# Patient Record
Sex: FEMALE | Race: BLACK OR AFRICAN AMERICAN | NOT HISPANIC OR LATINO | Employment: FULL TIME | URBAN - METROPOLITAN AREA
[De-identification: names, ages, dates, MRNs, and addresses within clinical notes are randomized per-mention and may not be internally consistent; named-entity substitution may affect disease eponyms.]

---

## 2024-08-19 DIAGNOSIS — N18.6 ESRD (END STAGE RENAL DISEASE): Primary | ICD-10-CM

## 2024-09-06 ENCOUNTER — TELEPHONE (OUTPATIENT)
Dept: VASCULAR SURGERY | Facility: CLINIC | Age: 49
End: 2024-09-06
Payer: MEDICAID

## 2024-09-06 NOTE — TELEPHONE ENCOUNTER
Attempted to contact the pt in reference to rescheduling an appt but no answer.Left a voice message with a call back number [phone].Appt rescheduled and appt letter placed in the mail.

## 2024-09-16 ENCOUNTER — TELEPHONE (OUTPATIENT)
Dept: VASCULAR SURGERY | Facility: CLINIC | Age: 49
End: 2024-09-16
Payer: MEDICAID

## 2024-09-16 NOTE — TELEPHONE ENCOUNTER
"Attempted to contact the pt in reference to appt scheduled for 9/18/24 but male answered and verbalized "wrong  number". Attempted to contact the pt at work and a female answered and verbalized "she's gone for the day she'll be back tomorrow". A message for the pt to contact the clinic at [phone].  "

## 2024-09-17 ENCOUNTER — TELEPHONE (OUTPATIENT)
Dept: VASCULAR SURGERY | Facility: CLINIC | Age: 49
End: 2024-09-17
Payer: MEDICAID

## 2024-09-17 NOTE — TELEPHONE ENCOUNTER
"Attempted to contact the pt again at work but the female answered and verbalized her name was "May".When I confirmed the identifiers it wasn't the pt. All other numbers on file were disconnected or wrong numbers. Spoke with Shelly at Dr Brizuela's office to obtain updated phone numbers.Spoke with the pt and informed her of appt scheduled for tomorrow.Pt verbalized she's not able to make it.Appt rescheduled and confirmed with the pt.   "

## 2024-10-30 ENCOUNTER — HOSPITAL ENCOUNTER (OUTPATIENT)
Dept: VASCULAR SURGERY | Facility: CLINIC | Age: 49
Discharge: HOME | End: 2024-10-30
Attending: SURGERY
Payer: MEDICAID

## 2024-10-30 ENCOUNTER — INITIAL CONSULT (OUTPATIENT)
Dept: VASCULAR SURGERY | Facility: CLINIC | Age: 49
End: 2024-10-30
Payer: MEDICAID

## 2024-10-30 ENCOUNTER — LAB VISIT (OUTPATIENT)
Dept: LAB | Facility: HOSPITAL | Age: 49
End: 2024-10-30
Attending: SURGERY
Payer: MEDICAID

## 2024-10-30 VITALS
TEMPERATURE: 98 F | WEIGHT: 152.13 LBS | BODY MASS INDEX: 25.34 KG/M2 | SYSTOLIC BLOOD PRESSURE: 156 MMHG | DIASTOLIC BLOOD PRESSURE: 76 MMHG | HEIGHT: 65 IN | HEART RATE: 87 BPM

## 2024-10-30 DIAGNOSIS — Z01.818 PRE-OP TESTING: ICD-10-CM

## 2024-10-30 DIAGNOSIS — N18.6 ESRD (END STAGE RENAL DISEASE): Primary | ICD-10-CM

## 2024-10-30 DIAGNOSIS — N18.6 ESRD (END STAGE RENAL DISEASE): ICD-10-CM

## 2024-10-30 LAB
ANION GAP SERPL CALC-SCNC: 13 MMOL/L (ref 8–16)
BASOPHILS # BLD AUTO: 0.04 K/UL (ref 0–0.2)
BASOPHILS NFR BLD: 0.5 % (ref 0–1.9)
BUN SERPL-MCNC: 18 MG/DL (ref 6–20)
CALCIUM SERPL-MCNC: 10.8 MG/DL (ref 8.7–10.5)
CHLORIDE SERPL-SCNC: 105 MMOL/L (ref 95–110)
CO2 SERPL-SCNC: 25 MMOL/L (ref 23–29)
CREAT SERPL-MCNC: 5.3 MG/DL (ref 0.5–1.4)
DIFFERENTIAL METHOD BLD: ABNORMAL
EOSINOPHIL # BLD AUTO: 0.3 K/UL (ref 0–0.5)
EOSINOPHIL NFR BLD: 4.1 % (ref 0–8)
ERYTHROCYTE [DISTWIDTH] IN BLOOD BY AUTOMATED COUNT: 14.6 % (ref 11.5–14.5)
EST. GFR  (NO RACE VARIABLE): 9.4 ML/MIN/1.73 M^2
GLUCOSE SERPL-MCNC: 158 MG/DL (ref 70–110)
HCT VFR BLD AUTO: 41.3 % (ref 37–48.5)
HGB BLD-MCNC: 12.7 G/DL (ref 12–16)
IMM GRANULOCYTES # BLD AUTO: 0.01 K/UL (ref 0–0.04)
IMM GRANULOCYTES NFR BLD AUTO: 0.1 % (ref 0–0.5)
LYMPHOCYTES # BLD AUTO: 2.3 K/UL (ref 1–4.8)
LYMPHOCYTES NFR BLD: 28.8 % (ref 18–48)
MCH RBC QN AUTO: 30.5 PG (ref 27–31)
MCHC RBC AUTO-ENTMCNC: 30.8 G/DL (ref 32–36)
MCV RBC AUTO: 99 FL (ref 82–98)
MONOCYTES # BLD AUTO: 0.5 K/UL (ref 0.3–1)
MONOCYTES NFR BLD: 5.8 % (ref 4–15)
NEUTROPHILS # BLD AUTO: 4.9 K/UL (ref 1.8–7.7)
NEUTROPHILS NFR BLD: 60.7 % (ref 38–73)
NRBC BLD-RTO: 0 /100 WBC
PLATELET # BLD AUTO: 262 K/UL (ref 150–450)
PMV BLD AUTO: 9.8 FL (ref 9.2–12.9)
POTASSIUM SERPL-SCNC: 4.5 MMOL/L (ref 3.5–5.1)
RBC # BLD AUTO: 4.16 M/UL (ref 4–5.4)
SODIUM SERPL-SCNC: 143 MMOL/L (ref 136–145)
WBC # BLD AUTO: 8.1 K/UL (ref 3.9–12.7)

## 2024-10-30 PROCEDURE — 99204 OFFICE O/P NEW MOD 45 MIN: CPT | Mod: S$PBB,,, | Performed by: SURGERY

## 2024-10-30 PROCEDURE — 85025 COMPLETE CBC W/AUTO DIFF WBC: CPT | Performed by: SURGERY

## 2024-10-30 PROCEDURE — 99213 OFFICE O/P EST LOW 20 MIN: CPT | Mod: PBBFAC | Performed by: SURGERY

## 2024-10-30 PROCEDURE — 36415 COLL VENOUS BLD VENIPUNCTURE: CPT | Performed by: SURGERY

## 2024-10-30 PROCEDURE — 99999 PR PBB SHADOW E&M-EST. PATIENT-LVL III: CPT | Mod: PBBFAC,,, | Performed by: SURGERY

## 2024-10-30 PROCEDURE — 80048 BASIC METABOLIC PNL TOTAL CA: CPT | Performed by: SURGERY

## 2024-10-30 RX ORDER — CALCITRIOL 0.5 UG/1
CAPSULE ORAL
COMMUNITY
Start: 2024-10-29

## 2024-10-30 RX ORDER — ATORVASTATIN CALCIUM 40 MG/1
40 TABLET, FILM COATED ORAL
COMMUNITY

## 2024-10-30 RX ORDER — HYDRALAZINE HYDROCHLORIDE 25 MG/1
25 TABLET, FILM COATED ORAL 3 TIMES DAILY
COMMUNITY

## 2024-10-30 RX ORDER — CLONIDINE HYDROCHLORIDE 0.3 MG/1
0.3 TABLET ORAL NIGHTLY
COMMUNITY

## 2024-10-30 RX ORDER — ASPIRIN 81 MG/1
81 TABLET ORAL
COMMUNITY

## 2024-10-30 RX ORDER — INSULIN LISPRO 100 [IU]/ML
INJECTION, SUSPENSION SUBCUTANEOUS
COMMUNITY

## 2024-10-30 RX ORDER — SEVELAMER CARBONATE 800 MG/1
800 TABLET, FILM COATED ORAL 3 TIMES DAILY
COMMUNITY

## 2024-10-30 RX ORDER — INSULIN GLARGINE 100 [IU]/ML
10 INJECTION, SOLUTION SUBCUTANEOUS NIGHTLY
COMMUNITY
Start: 2024-10-02

## 2024-10-30 RX ORDER — FUROSEMIDE 20 MG/1
20 TABLET ORAL
COMMUNITY
Start: 2024-10-02

## 2024-10-30 RX ORDER — CLOPIDOGREL BISULFATE 75 MG/1
75 TABLET ORAL
COMMUNITY

## 2024-10-30 RX ORDER — FLUOXETINE 10 MG/1
10 CAPSULE ORAL
COMMUNITY

## 2024-10-30 RX ORDER — CETIRIZINE HYDROCHLORIDE 10 MG/1
10 TABLET ORAL
COMMUNITY

## 2024-10-30 RX ORDER — CARISOPRODOL 350 MG/1
350 TABLET ORAL
COMMUNITY

## 2024-10-30 RX ORDER — AMLODIPINE BESYLATE 10 MG/1
10 TABLET ORAL
COMMUNITY

## 2024-10-30 RX ORDER — LOSARTAN POTASSIUM 100 MG/1
100 TABLET ORAL
COMMUNITY

## 2024-10-30 RX ORDER — ALPRAZOLAM 2 MG/1
2 TABLET ORAL 2 TIMES DAILY
COMMUNITY
Start: 2024-10-02

## 2024-10-30 RX ORDER — CYPROHEPTADINE HYDROCHLORIDE 2 MG/5ML
SOLUTION ORAL
COMMUNITY
Start: 2024-10-26

## 2024-10-30 RX ORDER — SODIUM BICARBONATE 650 MG/1
650 TABLET ORAL 3 TIMES DAILY
COMMUNITY
Start: 2024-05-14

## 2024-10-30 RX ORDER — BUPRENORPHINE AND NALOXONE 8; 2 MG/1; MG/1
FILM, SOLUBLE BUCCAL; SUBLINGUAL 3 TIMES DAILY
COMMUNITY

## 2024-11-01 ENCOUNTER — DOCUMENTATION ONLY (OUTPATIENT)
Dept: VASCULAR SURGERY | Facility: CLINIC | Age: 49
End: 2024-11-01
Payer: MEDICAID

## 2024-11-06 ENCOUNTER — HOSPITAL ENCOUNTER (OUTPATIENT)
Dept: RADIOLOGY | Facility: HOSPITAL | Age: 49
Discharge: HOME | End: 2024-11-06
Attending: SURGERY
Payer: MEDICAID

## 2024-11-06 ENCOUNTER — HOSPITAL ENCOUNTER (OUTPATIENT)
Dept: CARDIOLOGY | Facility: CLINIC | Age: 49
Discharge: HOME | End: 2024-11-06
Payer: MEDICAID

## 2024-11-06 DIAGNOSIS — Z01.818 PRE-OP TESTING: ICD-10-CM

## 2024-11-06 LAB
OHS QRS DURATION: 124 MS
OHS QTC CALCULATION: 472 MS

## 2024-11-06 PROCEDURE — 93005 ELECTROCARDIOGRAM TRACING: CPT | Mod: PBBFAC | Performed by: INTERNAL MEDICINE

## 2024-11-06 PROCEDURE — 71046 X-RAY EXAM CHEST 2 VIEWS: CPT | Mod: 26,,, | Performed by: RADIOLOGY

## 2024-11-06 PROCEDURE — 93010 ELECTROCARDIOGRAM REPORT: CPT | Mod: S$PBB,,, | Performed by: INTERNAL MEDICINE

## 2024-11-06 PROCEDURE — 71046 X-RAY EXAM CHEST 2 VIEWS: CPT | Mod: TC

## 2024-11-13 ENCOUNTER — TELEPHONE (OUTPATIENT)
Dept: VASCULAR SURGERY | Facility: CLINIC | Age: 49
End: 2024-11-13
Payer: MEDICAID

## 2024-11-13 NOTE — TELEPHONE ENCOUNTER
Spoke with the pt and informed her of the time of arrival for surgery tomorrow is 530am at the main campus on [address],second floor,[facility], Pt also reminded not to eat anything after 1130pm but may have water only based on fluid restriction.Pt verbalized understanding of information received.

## 2024-11-18 ENCOUNTER — TELEPHONE (OUTPATIENT)
Dept: VASCULAR SURGERY | Facility: CLINIC | Age: 49
End: 2024-11-18
Payer: MEDICAID

## 2024-11-18 DIAGNOSIS — N18.6 ESRD (END STAGE RENAL DISEASE): Primary | ICD-10-CM

## 2024-11-18 NOTE — TELEPHONE ENCOUNTER
Spoke with the pt and informed her of the new surgery date of 12/17/24.Pt also informed not to eat anything after 11pm on 12/16/24 and she verbalizes understanding of information received.

## 2024-12-16 ENCOUNTER — ANESTHESIA EVENT (OUTPATIENT)
Dept: SURGERY | Facility: HOSPITAL | Age: 49
End: 2024-12-16
Payer: MEDICAID

## 2024-12-16 ENCOUNTER — TELEPHONE (OUTPATIENT)
Dept: VASCULAR SURGERY | Facility: CLINIC | Age: 49
End: 2024-12-16
Payer: MEDICAID

## 2024-12-16 NOTE — TELEPHONE ENCOUNTER
Spoke with the pt and informed her of the time of arrival for surgery tomorrow is 5am at the main campus on [facility],second floor,[facility].Pt also informed not to eat anything after 11pm and may have water only until 5am but to remain on fluid restrictions.pt verbalized understanding of information received.

## 2024-12-17 ENCOUNTER — HOSPITAL ENCOUNTER (OUTPATIENT)
Facility: HOSPITAL | Age: 49
Discharge: HOME | End: 2024-12-17
Attending: SURGERY | Admitting: SURGERY
Payer: MEDICAID

## 2024-12-17 ENCOUNTER — ANESTHESIA (OUTPATIENT)
Dept: SURGERY | Facility: HOSPITAL | Age: 49
End: 2024-12-17
Payer: MEDICAID

## 2024-12-17 VITALS
BODY MASS INDEX: 23.3 KG/M2 | HEART RATE: 67 BPM | RESPIRATION RATE: 12 BRPM | HEIGHT: 66 IN | SYSTOLIC BLOOD PRESSURE: 149 MMHG | OXYGEN SATURATION: 99 % | WEIGHT: 145 LBS | DIASTOLIC BLOOD PRESSURE: 82 MMHG | TEMPERATURE: 99 F

## 2024-12-17 DIAGNOSIS — N18.6 ESRD (END STAGE RENAL DISEASE): Primary | ICD-10-CM

## 2024-12-17 DIAGNOSIS — I77.0 ARTERIOVENOUS FISTULA: ICD-10-CM

## 2024-12-17 LAB
POCT GLUCOSE: 218 MG/DL (ref 70–110)
POCT GLUCOSE: 230 MG/DL (ref 70–110)

## 2024-12-17 PROCEDURE — 82962 GLUCOSE BLOOD TEST: CPT | Performed by: SURGERY

## 2024-12-17 PROCEDURE — 25000003 PHARM REV CODE 250: Performed by: STUDENT IN AN ORGANIZED HEALTH CARE EDUCATION/TRAINING PROGRAM

## 2024-12-17 PROCEDURE — 36000706: Performed by: SURGERY

## 2024-12-17 PROCEDURE — 71000016 HC POSTOP RECOV ADDL HR: Performed by: SURGERY

## 2024-12-17 PROCEDURE — 37000009 HC ANESTHESIA EA ADD 15 MINS: Performed by: SURGERY

## 2024-12-17 PROCEDURE — 63600175 PHARM REV CODE 636 W HCPCS: Performed by: REGISTERED NURSE

## 2024-12-17 PROCEDURE — 63600175 PHARM REV CODE 636 W HCPCS: Performed by: SURGERY

## 2024-12-17 PROCEDURE — 71000015 HC POSTOP RECOV 1ST HR: Performed by: SURGERY

## 2024-12-17 PROCEDURE — 25000003 PHARM REV CODE 250: Performed by: REGISTERED NURSE

## 2024-12-17 PROCEDURE — 36000707: Performed by: SURGERY

## 2024-12-17 PROCEDURE — 37000008 HC ANESTHESIA 1ST 15 MINUTES: Performed by: SURGERY

## 2024-12-17 PROCEDURE — 63600175 PHARM REV CODE 636 W HCPCS: Performed by: STUDENT IN AN ORGANIZED HEALTH CARE EDUCATION/TRAINING PROGRAM

## 2024-12-17 PROCEDURE — 71000044 HC DOSC ROUTINE RECOVERY FIRST HOUR: Performed by: SURGERY

## 2024-12-17 RX ORDER — OXYCODONE HYDROCHLORIDE 5 MG/1
5 TABLET ORAL EVERY 4 HOURS PRN
Qty: 8 TABLET | Refills: 0 | Status: SHIPPED | OUTPATIENT
Start: 2024-12-17

## 2024-12-17 RX ORDER — PROPOFOL 10 MG/ML
VIAL (ML) INTRAVENOUS
Status: DISCONTINUED | OUTPATIENT
Start: 2024-12-17 | End: 2024-12-17

## 2024-12-17 RX ORDER — HYDROMORPHONE HYDROCHLORIDE 1 MG/ML
0.2 INJECTION, SOLUTION INTRAMUSCULAR; INTRAVENOUS; SUBCUTANEOUS EVERY 5 MIN PRN
Status: DISCONTINUED | OUTPATIENT
Start: 2024-12-17 | End: 2024-12-17 | Stop reason: HOSPADM

## 2024-12-17 RX ORDER — HEPARIN SODIUM 1000 [USP'U]/ML
INJECTION, SOLUTION INTRAVENOUS; SUBCUTANEOUS
Status: DISCONTINUED | OUTPATIENT
Start: 2024-12-17 | End: 2024-12-17

## 2024-12-17 RX ORDER — HALOPERIDOL 5 MG/ML
0.5 INJECTION INTRAMUSCULAR EVERY 10 MIN PRN
Status: DISCONTINUED | OUTPATIENT
Start: 2024-12-17 | End: 2024-12-17 | Stop reason: HOSPADM

## 2024-12-17 RX ORDER — FENTANYL CITRATE 50 UG/ML
25 INJECTION, SOLUTION INTRAMUSCULAR; INTRAVENOUS EVERY 5 MIN PRN
Status: DISCONTINUED | OUTPATIENT
Start: 2024-12-17 | End: 2024-12-17 | Stop reason: HOSPADM

## 2024-12-17 RX ORDER — HEPARIN SOD,PORCINE/0.9 % NACL 1000/500ML
INTRAVENOUS SOLUTION INTRAVENOUS
Status: DISCONTINUED | OUTPATIENT
Start: 2024-12-17 | End: 2024-12-17 | Stop reason: HOSPADM

## 2024-12-17 RX ORDER — CEFAZOLIN 2 G/1
2 INJECTION, POWDER, FOR SOLUTION INTRAMUSCULAR; INTRAVENOUS
Status: DISCONTINUED | OUTPATIENT
Start: 2024-12-17 | End: 2024-12-17 | Stop reason: HOSPADM

## 2024-12-17 RX ORDER — MIDAZOLAM HYDROCHLORIDE 1 MG/ML
.5-4 INJECTION, SOLUTION INTRAMUSCULAR; INTRAVENOUS
Status: DISCONTINUED | OUTPATIENT
Start: 2024-12-17 | End: 2024-12-17 | Stop reason: HOSPADM

## 2024-12-17 RX ORDER — GLUCAGON 1 MG
1 KIT INJECTION
Status: DISCONTINUED | OUTPATIENT
Start: 2024-12-17 | End: 2024-12-17 | Stop reason: HOSPADM

## 2024-12-17 RX ORDER — PROPOFOL 10 MG/ML
INJECTION, EMULSION INTRAVENOUS CONTINUOUS PRN
Status: DISCONTINUED | OUTPATIENT
Start: 2024-12-17 | End: 2024-12-17

## 2024-12-17 RX ORDER — SODIUM CHLORIDE 9 MG/ML
INJECTION, SOLUTION INTRAVENOUS CONTINUOUS
Status: DISCONTINUED | OUTPATIENT
Start: 2024-12-17 | End: 2024-12-17 | Stop reason: HOSPADM

## 2024-12-17 RX ORDER — LIDOCAINE HYDROCHLORIDE 20 MG/ML
INJECTION INTRAVENOUS
Status: DISCONTINUED | OUTPATIENT
Start: 2024-12-17 | End: 2024-12-17

## 2024-12-17 RX ORDER — ONDANSETRON HYDROCHLORIDE 2 MG/ML
INJECTION, SOLUTION INTRAVENOUS
Status: DISCONTINUED | OUTPATIENT
Start: 2024-12-17 | End: 2024-12-17

## 2024-12-17 RX ORDER — DEXMEDETOMIDINE HYDROCHLORIDE 100 UG/ML
INJECTION, SOLUTION INTRAVENOUS
Status: DISCONTINUED | OUTPATIENT
Start: 2024-12-17 | End: 2024-12-17

## 2024-12-17 RX ORDER — PAPAVERINE HYDROCHLORIDE 30 MG/ML
INJECTION INTRAMUSCULAR; INTRAVENOUS
Status: DISCONTINUED | OUTPATIENT
Start: 2024-12-17 | End: 2024-12-17 | Stop reason: HOSPADM

## 2024-12-17 RX ORDER — OXYCODONE HYDROCHLORIDE 5 MG/1
5 TABLET ORAL ONCE
Status: COMPLETED | OUTPATIENT
Start: 2024-12-17 | End: 2024-12-17

## 2024-12-17 RX ORDER — SODIUM CHLORIDE 0.9 % (FLUSH) 0.9 %
10 SYRINGE (ML) INJECTION
Status: DISCONTINUED | OUTPATIENT
Start: 2024-12-17 | End: 2024-12-17 | Stop reason: HOSPADM

## 2024-12-17 RX ORDER — PROTAMINE SULFATE 10 MG/ML
INJECTION, SOLUTION INTRAVENOUS
Status: DISCONTINUED | OUTPATIENT
Start: 2024-12-17 | End: 2024-12-17

## 2024-12-17 RX ORDER — FENTANYL CITRATE 50 UG/ML
25-200 INJECTION, SOLUTION INTRAMUSCULAR; INTRAVENOUS
Status: DISCONTINUED | OUTPATIENT
Start: 2024-12-17 | End: 2024-12-17 | Stop reason: HOSPADM

## 2024-12-17 RX ADMIN — HEPARIN SODIUM 5000 UNITS: 1000 INJECTION, SOLUTION INTRAVENOUS; SUBCUTANEOUS at 08:12

## 2024-12-17 RX ADMIN — DEXMEDETOMIDINE 8 MCG: 100 INJECTION, SOLUTION, CONCENTRATE INTRAVENOUS at 07:12

## 2024-12-17 RX ADMIN — SODIUM CHLORIDE: 9 INJECTION, SOLUTION INTRAVENOUS at 07:12

## 2024-12-17 RX ADMIN — DEXMEDETOMIDINE 8 MCG: 100 INJECTION, SOLUTION, CONCENTRATE INTRAVENOUS at 08:12

## 2024-12-17 RX ADMIN — LIDOCAINE HYDROCHLORIDE 50 MG: 20 INJECTION INTRAVENOUS at 07:12

## 2024-12-17 RX ADMIN — MEPIVACAINE HYDROCHLORIDE 25 ML: 15 INJECTION, SOLUTION EPIDURAL; INFILTRATION at 07:12

## 2024-12-17 RX ADMIN — OXYCODONE 5 MG: 5 TABLET ORAL at 11:12

## 2024-12-17 RX ADMIN — PROTAMINE SULFATE 20 MG: 10 INJECTION, SOLUTION INTRAVENOUS at 08:12

## 2024-12-17 RX ADMIN — PROTAMINE SULFATE 5 MG: 10 INJECTION, SOLUTION INTRAVENOUS at 08:12

## 2024-12-17 RX ADMIN — ONDANSETRON 4 MG: 2 INJECTION INTRAMUSCULAR; INTRAVENOUS at 07:12

## 2024-12-17 RX ADMIN — Medication 2 G: at 07:12

## 2024-12-17 RX ADMIN — PROPOFOL 50 MCG/KG/MIN: 10 INJECTION, EMULSION INTRAVENOUS at 07:12

## 2024-12-17 RX ADMIN — PROPOFOL 50 MG: 10 INJECTION, EMULSION INTRAVENOUS at 07:12

## 2024-12-17 NOTE — ANESTHESIA PREPROCEDURE EVALUATION
12/17/2024  May Feldman is a 49 y.o., female.      Pre-op Assessment    I have reviewed the Patient Summary Reports.     I have reviewed the Nursing Notes. I have reviewed the NPO Status.   I have reviewed the Medications.     Review of Systems  Anesthesia Hx:  No problems with previous Anesthesia             Denies Family Hx of Anesthesia complications.    Denies Personal Hx of Anesthesia complications.                    Social:  Smoker       Hematology/Oncology:  Hematology Normal   Oncology Normal                                   EENT/Dental:  EENT/Dental Normal           Cardiovascular:     Hypertension   CAD   CABG/stent                                       Pulmonary:  Pulmonary Normal                       Renal/:  Chronic Renal Disease, ESRD, Dialysis                Musculoskeletal:  Musculoskeletal Normal                Neurological:  TIA,                                     Endocrine:  Diabetes, type 2               Physical Exam  General: Oriented, Alert and Cooperative    Airway:  Mallampati: III   Mouth Opening: Normal  TM Distance: Normal    Dental:  Periodontal disease    Chest/Lungs:  Clear to auscultation, Normal Respiratory Rate    Heart:  Rate: Normal  Rhythm: Regular Rhythm        Anesthesia Plan  Type of Anesthesia, risks & benefits discussed:    Anesthesia Type: Regional, Gen Natural Airway  Intra-op Monitoring Plan: Standard ASA Monitors  Post Op Pain Control Plan: multimodal analgesia, peripheral nerve block and IV/PO Opioids PRN  Induction:  IV  Airway Plan: , Post-Induction  Informed Consent: Informed consent signed with the Patient and all parties understand the risks and agree with anesthesia plan.  All questions answered.   ASA Score: 3  Day of Surgery Review of History & Physical: H&P Update referred to the surgeon/provider.    Ready For Surgery From Anesthesia Perspective.      .

## 2024-12-17 NOTE — PLAN OF CARE
----- Message from Kady Worley CNP sent at 9/29/2022  6:21 PM CDT -----  Regarding: followup on patient       Discharge instructions reviewed with daughter.  Pt states ready to get dressed

## 2024-12-17 NOTE — ANESTHESIA POSTPROCEDURE EVALUATION
Anesthesia Post Evaluation    Patient: May Feldman    Procedure(s) Performed: Procedure(s) (LRB):  INSERTION, GRAFT, ARTERIOVENOUS, UPPER EXTREMITY (Left)    Final Anesthesia Type: general      Patient location during evaluation: PACU  Patient participation: Yes- Able to Participate  Level of consciousness: awake and alert  Post-procedure vital signs: reviewed and stable  Pain management: adequate  Airway patency: patent  DERIC mitigation strategies: Multimodal analgesia, Preoperative use of mandibular advancement devices or oral appliances and Intraoperative administration of CPAP, nasopharyngeal airway, or oral appliance during sedation  PONV status at discharge: No PONV  Anesthetic complications: no      Cardiovascular status: blood pressure returned to baseline, hemodynamically stable and stable  Respiratory status: unassisted and spontaneous ventilation  Hydration status: euvolemic  Follow-up not needed.              Vitals Value Taken Time   /82 12/17/24 1146   Temp 37 °C (98.6 °F) 12/17/24 1145   Pulse 72 12/17/24 1154   Resp 11 12/17/24 1153   SpO2 100 % 12/17/24 1154   Vitals shown include unfiled device data.      No case tracking events are documented in the log.      Pain/Kaylen Score: Pain Rating Prior to Med Admin: 5 (12/17/2024 11:06 AM)  Kaylen Score: 9 (12/17/2024 11:45 AM)

## 2024-12-17 NOTE — DISCHARGE INSTRUCTIONS
VASCULAR SURGERY DISCHARGE INSTRUCTIONS    Woundcare:  - Take your incision dressing off 2 days after your surgery and gently rinse your incision with soap and water daily. Pad the incision dry afterward  - If you have a dialysis catheter in place, keep your catheter dressing clean and dry  - If you do not have a catheter, take a full shower daily beginning 2 days after the surgery. Allow soap and water to run over your incision. Pat the incision dry afterward  - When resting or sleeping, try to keep your arm elevated to shoulder level on pillows to reduce swelling  - If you notice clear drainage from your incision, you can apply dry gauze daily and secure in place with tape or gentle elastic wrap    Activity:  - Avoid prolonged exertion of the affected arm  - Avoid keeping your arm down below your chest for prolonged periods of time (this could lead to increased swelling)  - No heavy lifting with the affected arm  - Sleep with your arm elevated on pillows at night to reduce swelling  -- No swimming in pools, bookjam, [employer] etc. for 6 weeks after your surgery    Diet:  -Resume your pre-operative home diet    Follow up:  -Refer to follow up instructions     Call Vascular Surgery Office at [phone] if you experience:  -Increased redness, warmth, tenderness, or draining pus at your incision(s)  -Worsening fevers, chills, nausea/vomiting  -Pain, weakness, coldness, or numbness in your hand  -Uncontrolled pain  -Your call will be returned within 24 hours and further instructions will be provided    Go to ER/Urgent Care if you experience:  -Worsening shortness of breath or chest pain     DISCHARGE INSTRUCTIONS FOR AV FISTULA OR DECLOT:    ACTIVITY LEVEL:  If you received sedation or an anesthetic, you may feel sleepy for several hours. Rest until you are more awake.  Gradually resume light activity. Avoid heavy lifting and tight garments. Remember do not let your affected  arm be used for blood pressure measurements  or for blood drawing. You should check with your doctor about  when you may return to work. No heavy lifting.  DIET:  At home, continue with liquids, and if there is no nausea, you may eat a soft diet. Gradually resume your  regular diet.  BATHING:  _____ Sponge bathe only.  _____ Remove your dressing in ____ days.  _____ You may shower in ____ days.  MEDICATIONS:  You will receive instructions for any pain and/or antibiotic prescriptions. Pain medication should be taken only  if needed and as directed. Antibiotics should be taken as directed until the entire prescription is completed.  OTHER INSTRUCTIONS:_________________________________________________________________  GENERAL INSTRUCTIONS:  You should learn to recognize the flow (thrill) in the graft. Ask the nurse to show you how to check this.  WHEN TO CALL THE DOCTOR:   If there are marked changes in the thrill or no thrill at all.   Any obvious bleeding.   Redness or swelling around the incision.   Fever over 101°F (38.4°C).   Drainage (pus) from the wound.   Persistent pain not relieved by the pain medication.   Numbness or tingling in your hand that does not go away.  RETURN APPOINTMENT:  _________________________________________________________________________________________  FOR EMERGENCIES:  If any unusual problems or difficulties occur, contact  _____________________ or the resident at  [phone] (page ) or at the Clinic office, _____________________.

## 2024-12-17 NOTE — H&P
May Gaston  12/17/2024    HPI:  Patient is a 49 y.o. female with a h/o stage V CKD on HD since early 2024, active tobacco use, HLD, HTN, CAD/MI s/p PCIs x2, TIA, and DM II who is here today in the preoperative suite for insertion of left AV Graft. Right hand dominant.     +MI April 2024  Tobacco use: yes, 1/2 pack per day; 35 year smoking hx    Pernell Lucas MD   Employment: Disabled    No current facility-administered medications for this encounter.    PHYSICAL EXAM:                  Extremities:   2+ L brachial and radial pulses     Negative L Ulises's test    LAB RESULTS:  Lab Results   Component Value Date    K 4.5 10/30/2024    K 5.4 (H) 08/13/2023    K 5.9 (H) 03/13/2023    CREATININE 5.3 (H) 10/30/2024    CREATININE 6.68 (H) 03/13/2023    CREATININE 5.32 (H) 01/25/2023     Lab Results   Component Value Date    WBC 8.10 10/30/2024    WBC 0-5 08/14/2023    WBC 7.8 08/03/2023    HCT 41.3 10/30/2024    HCT 26.3 (L) 08/03/2023    HCT 28.9 (L) 04/04/2014     10/30/2024     (H) 04/04/2014     Lab Results   Component Value Date    HGBA1C 7.8 (H) 08/02/2023    HGBA1C 7.4 (H) 03/13/2023    HGBA1C 7.3 (H) 11/07/2022       IMAGING (I have independently reviewed and interpreted the following tests):  Vein mapping: small cephalic veins x2  Basilic vein, R 2.9mm, L 1.5mm  Brachial veins R 3.8mm, L 3.7mm  Patent brachial arteries x2    IMP/PLAN:     49 y.o. female with a h/o stage V CKD on HD since early 2024, active tobacco use, HLD, HTN, CAD/MI s/p PCIs x2, TIA, and DM II in need of AV acces - seems she has no family support and gets rides from HD unit  R hand dominant.   Plan to proceed for LUE AV Graft later today (12/17)       Ehsan Melchor MD DFSVS FACS   Vascular/Endovascular Surgery    Time spent personally reviewing the patient's chart, interpreting images and test, and conferring with physicians was HBtimespent2: 45 mins.

## 2024-12-17 NOTE — BRIEF OP NOTE
Brief Operative Note  Date: 12/17/2024    Pre-op Diagnosis:  ESRD (end stage renal disease) [N18.6]    Post-op Diagnosis:  Same    Procedure(s): Creation L 1st stage brachial artery to basilic vein AVF     Surgeon: RUBIN FOUNTAIN FACS    Assistant: Renuka Cook MD - Resident - Assisting    Anesthesia: Regional    Findings/Key Components:  Strong thrill in L 1st stage brachial artery to basilic vein AVF; dilated to 3-4mm  2+ L radial pulses - no augmentation with with AVF compression  Will need transposition in 3+ months    EBL: < 10 ml    Implants: * No implants in log *    Specimens (From admission, onward)      None          I attest to being present for the procedure and performing the case.  MD ESSIE Whitt FACS  Discharge Note  SUMMARY    Admit Date: 12/17/2024    Attending Physician: Ehsan Melchor MD FACS    Discharge Physician: Ehsan Melchor MD FACS    Discharge Date: 12/17/2024    Final Diagnosis: ESRD (end stage renal disease) [N18.6]    Outcome of Treatment: Successful AVF creation    Disposition: Home or self-care    Patient Instructions:   Current Discharge Medication List        CONTINUE these medications which have NOT CHANGED    Details   ALPRAZolam (XANAX) 2 MG Tab Take 2 mg by mouth 2 (two) times daily.      amLODIPine (NORVASC) 10 MG tablet Take 10 mg by mouth.      aspirin (ECOTRIN) 81 MG EC tablet Take 81 mg by mouth.      atorvastatin (LIPITOR) 40 MG tablet Take 40 mg by mouth.      buprenorphine-naloxone 8-2 mg (SUBOXONE) 8-2 mg Place under the tongue 3 (three) times daily.      carisoprodoL (SOMA) 350 MG tablet Take 350 mg by mouth.      cetirizine (ZYRTEC) 10 MG tablet Take 10 mg by mouth.      cloNIDine (CATAPRES) 0.3 MG tablet Take 0.3 mg by mouth every evening.      clopidogreL (PLAVIX) 75 mg tablet Take 75 mg by mouth.      cyproheptadine (,PERIACTIN,) 2 mg/5 mL syrup Take by mouth.      FLUoxetine 10 MG capsule Take 10 mg by mouth.      furosemide (LASIX) 20 MG tablet Take  20 mg by mouth.      HUMALOG MIX 75-25 KWIKPEN 100 unit/mL (75-25) InPn SMARTSI Unit(s) SUB-Q      hydrALAZINE (APRESOLINE) 25 MG tablet Take 25 mg by mouth 3 (three) times daily.      lisinopril 10 MG tablet Take 10 mg by mouth once daily.      losartan (COZAAR) 100 MG tablet Take 100 mg by mouth.      sodium bicarbonate 650 MG tablet Take 650 mg by mouth 3 (three) times daily.      calcitRIOL (ROCALTROL) 0.5 MCG Cap Take by mouth.      insulin lispro (HUMALOG) 100 unit/mL injection Inject into the skin 3 (three) times daily before meals.      LANTUS SOLOSTAR U-100 INSULIN 100 unit/mL (3 mL) InPn pen Inject 10 Units into the skin every evening.      sevelamer carbonate (RENVELA) 800 mg Tab Take 800 mg by mouth 3 (three) times daily.             Diet:  Resume pre-operative diet    Activity:  Ad tona    Follow-up:  Follow-up in clinic with Dr Melchor within 4+ weeks; please call clinic nurse at

## 2024-12-17 NOTE — ANESTHESIA PROCEDURE NOTES
Left supraclavicular and ICB nerve block    Patient location during procedure: OR    Reason for block: primary anesthetic    Diagnosis: ESRD   Start time: 12/17/2024 7:05 AM  Timeout: 12/17/2024 7:05 AM   End time: 12/17/2024 7:10 AM    Staffing  Authorizing Provider: William Maria MD  Performing Provider: Livia Lambert MD    Staffing  Performed by: Livia Lambert MD  Authorized by: William Maria MD    Preanesthetic Checklist  Completed: patient identified, IV checked, site marked, risks and benefits discussed, surgical consent, monitors and equipment checked, pre-op evaluation and timeout performed  Peripheral Block  Patient position: supine  Prep: ChloraPrep  Patient monitoring: heart rate, cardiac monitor, continuous pulse ox, continuous capnometry and frequent blood pressure checks  Block type: supraclavicular  Laterality: left  Injection technique: single shot  Needle  Needle type: Stimuplex   Needle gauge: 22 G  Needle length: 2 in  Needle localization: anatomical landmarks and ultrasound guidance   -ultrasound image captured on disc.  Assessment  Injection assessment: negative aspiration, negative parasthesia and local visualized surrounding nerve  Paresthesia pain: none  Heart rate change: no  Slow fractionated injection: yes  Pain Tolerance: comfortable throughout block  Medications:    Medications: mepivacaine (CARBOCAINE) injection 15 mg/mL (1.5%) - Perineural   25 mL - 12/17/2024 7:10:00 AM    Additional Notes  Intercostal brachial field block performed with mepivacaine 1.5% 5ml for additional coverage.    VSS.  See anesthesia record.  Patient tolerated procedure well.  Mepivacaine 1.5% with 1:300,000 epi administered in supraclavicular area.

## 2024-12-17 NOTE — TRANSFER OF CARE
"Anesthesia Transfer of Care Note    Patient: May Feldman    Procedure(s) Performed: Procedure(s) (LRB):  INSERTION, GRAFT, ARTERIOVENOUS, UPPER EXTREMITY (Left)    Patient location: [facility]    Anesthesia Type: general    Transport from OR: Transported from OR on 2-3 L/min O2 by NC with adequate spontaneous ventilation    Post pain: adequate analgesia    Post assessment: no apparent anesthetic complications and tolerated procedure well    Post vital signs: stable    Level of consciousness: responds to stimulation    Nausea/Vomiting: no nausea/vomiting    Complications: none    Transfer of care protocol was followedComments: Nurse at bedside, VSS, spont reg resp noted      Last vitals: Visit Vitals  BP (!) 144/72   Pulse 74   Temp 36.6 °C (97.9 °F) (Temporal)   Resp 17   Ht 5' 6" (1.676 m)   Wt 65.8 kg (145 lb)   LMP  (Within Years)   SpO2 97%   Breastfeeding No   BMI 23.40 kg/m²     "

## 2024-12-19 DIAGNOSIS — Z98.890 S/P ARTERIOVENOUS (AV) FISTULA CREATION: Primary | ICD-10-CM

## 2024-12-19 NOTE — OP NOTE
OPERATIVE REPORT    Indications: May Feldman is a 49 y.o. female  With end stage renal disease and need for long-term dialysis access.    Pre-operative Diagnosis:  Stage V CKD in need of AV access.    Post-operative Diagnosis: same.    Operation: Creation L 1st stage brachial artery to basilic vein AVF     Surgeon: RUBIN Melchor MD DFSVS FACS    Assistants: Renuka Cook MD - Resident - Assisting     Anesthesia: regional    Findings/Key Components:  Strong thrill in L 1st stage brachial artery to basilic vein AVF; dilated to 3-4mm  2+ L radial pulses - no augmentation with with AVF compression  Will need transposition in 3+ months    EBL::  <10 ml           Specimens: None           Complications:  None; patient tolerated the procedure well.           Disposition: PACU - hemodynamically stable.           Condition: stable    OPERATIVE PROCEDURE:   The patient was seen in the Holding Room. The risks, benefits, complications, treatment options, and expected outcomes were discussed with the patient. The patient concurred with the proposed plan, giving informed consent.  The site of surgery properly noted/marked.    The patient was brought to the Operating Room. The left arm prepped and draped in a sterile fashion. A transverse incision was made just proximal to the left antecubital crease.     The basilic vein was identified and was dissected proximally and distally. The brachial artery was then dissected and proximal and distal control were obtained by placing vessel loops around it.  After flushing the vessels with heparin, the vessels were clamped and a end-to-side anastomosis between the basilic vein and the brachial artery was created using 6-0 Prolene sutures in a continuous running manner. After completion of the anastomosis, the clamps were released. Hemostasis was secured. Good thrill was noted in the fistula and the incision was then closed in two layers, subcutaneous tissue with 3-0 Vicryl and skin with 2-0  Nylon interrupted vertical mattress sutures.    The patient was transported to the PACU in stable condition. All sponge, instrument and needle counts were correct at the end of the case.     Dr. Melchor was present and scrubbed for the entire procedure.    Ehsan Melchor MD DFS FACS   Vascular/Endovascular Surgery

## 2025-01-15 ENCOUNTER — HOSPITAL ENCOUNTER (OUTPATIENT)
Dept: VASCULAR SURGERY | Facility: CLINIC | Age: 50
Discharge: HOME | End: 2025-01-15
Attending: SURGERY
Payer: MEDICAID

## 2025-01-15 ENCOUNTER — OFFICE VISIT (OUTPATIENT)
Dept: VASCULAR SURGERY | Facility: CLINIC | Age: 50
End: 2025-01-15
Payer: MEDICAID

## 2025-01-15 VITALS
HEIGHT: 66 IN | SYSTOLIC BLOOD PRESSURE: 160 MMHG | TEMPERATURE: 98 F | DIASTOLIC BLOOD PRESSURE: 77 MMHG | HEART RATE: 74 BPM | WEIGHT: 154.31 LBS | BODY MASS INDEX: 24.8 KG/M2

## 2025-01-15 DIAGNOSIS — I77.0 ARTERIOVENOUS FISTULA: Primary | ICD-10-CM

## 2025-01-15 DIAGNOSIS — I73.9 PERIPHERAL VASCULAR DISEASE: Primary | ICD-10-CM

## 2025-01-15 DIAGNOSIS — F17.200 CURRENT EVERY DAY SMOKER: Primary | ICD-10-CM

## 2025-01-15 DIAGNOSIS — Z98.890 S/P ARTERIOVENOUS (AV) FISTULA CREATION: ICD-10-CM

## 2025-01-15 PROCEDURE — 99024 POSTOP FOLLOW-UP VISIT: CPT | Mod: S$PBB,,, | Performed by: SURGERY

## 2025-01-15 PROCEDURE — 3008F BODY MASS INDEX DOCD: CPT | Mod: CPTII,,, | Performed by: SURGERY

## 2025-01-15 PROCEDURE — 93990 DOPPLER FLOW TESTING: CPT | Mod: 26,S$PBB,, | Performed by: SURGERY

## 2025-01-15 PROCEDURE — 99213 OFFICE O/P EST LOW 20 MIN: CPT | Mod: PBBFAC | Performed by: SURGERY

## 2025-01-15 PROCEDURE — 3077F SYST BP >= 140 MM HG: CPT | Mod: CPTII,,, | Performed by: SURGERY

## 2025-01-15 PROCEDURE — 99999 PR PBB SHADOW E&M-EST. PATIENT-LVL III: CPT | Mod: PBBFAC,,, | Performed by: SURGERY

## 2025-01-15 PROCEDURE — 3078F DIAST BP <80 MM HG: CPT | Mod: CPTII,,, | Performed by: SURGERY

## 2025-01-15 PROCEDURE — 1159F MED LIST DOCD IN RCRD: CPT | Mod: CPTII,,, | Performed by: SURGERY

## 2025-01-15 PROCEDURE — 93990 DOPPLER FLOW TESTING: CPT | Mod: PBBFAC | Performed by: SURGERY

## 2025-01-15 RX ORDER — LACTULOSE 10 G/15ML
SOLUTION ORAL; RECTAL
COMMUNITY
Start: 2024-12-31

## 2025-01-15 RX ORDER — PANTOPRAZOLE SODIUM 40 MG/1
40 TABLET, DELAYED RELEASE ORAL
COMMUNITY
Start: 2025-01-02

## 2025-01-15 RX ORDER — METOPROLOL TARTRATE 100 MG/1
100 TABLET ORAL 2 TIMES DAILY
COMMUNITY

## 2025-01-15 RX ORDER — APIXABAN 2.5 MG/1
2.5 TABLET, FILM COATED ORAL 2 TIMES DAILY
COMMUNITY

## 2025-01-15 RX ORDER — SEMAGLUTIDE 0.68 MG/ML
INJECTION, SOLUTION SUBCUTANEOUS
COMMUNITY
Start: 2025-01-13

## 2025-01-15 NOTE — PROGRESS NOTES
"May Feldman  01/15/2025    HPI:  Patient is a 49 y.o. female with a h/o stage V CKD on HD since early 2024, active tobacco use, HLD, HTN, CAD/MI s/p PCIs x2, TIA, and DM II who is here today for evaluation of HD access. HD T/R/S via R tunneled cath. Dialysis for almost 1 year. Right hand dominant.     +MI April 2024  Tobacco use: yes, 1/2 pack per day; 35 year smoking hx    12/19/24  L 1st stage brachial artery to basilic vein AVF     Findings/Key Components:  Strong thrill in L 1st stage brachial artery to basilic vein AVF; dilated to 3-4mm  2+ L radial pulses - no augmentation with with AVF compression  Will need transposition in 3+ months    Since surgery she notes numbness in the medial aspect of the forearm that extends down to the fingertips. She feels that her arm is "heavy" but has no issues using the left arm. The arm is full strength. She has not started dialysis with the fistula yet. She denies SOB, chest pain, flank pain, or other symptoms.     No, Primary Doctor   Employment: Disabled      Current Outpatient Medications:     ALPRAZolam (XANAX) 2 MG Tab, Take 2 mg by mouth 2 (two) times daily., Disp: , Rfl:     amLODIPine (NORVASC) 10 MG tablet, Take 10 mg by mouth., Disp: , Rfl:     aspirin (ECOTRIN) 81 MG EC tablet, Take 81 mg by mouth., Disp: , Rfl:     atorvastatin (LIPITOR) 40 MG tablet, Take 40 mg by mouth., Disp: , Rfl:     blood sugar diagnostic Strp, USE ONE test strip UP TO THREE TIMES DAILY, Disp: , Rfl:     buprenorphine-naloxone 8-2 mg (SUBOXONE) 8-2 mg, Place under the tongue 3 (three) times daily., Disp: , Rfl:     calcitRIOL (ROCALTROL) 0.5 MCG Cap, Take by mouth., Disp: , Rfl:     carisoprodoL (SOMA) 350 MG tablet, Take 350 mg by mouth., Disp: , Rfl:     cetirizine (ZYRTEC) 10 MG tablet, Take 10 mg by mouth., Disp: , Rfl:     cloNIDine (CATAPRES) 0.3 MG tablet, Take 0.3 mg by mouth every evening., Disp: , Rfl:     clopidogreL (PLAVIX) 75 mg tablet, Take 75 mg by mouth., Disp: , Rfl: "     cyproheptadine (,PERIACTIN,) 2 mg/5 mL syrup, Take by mouth., Disp: , Rfl:     ELIQUIS 2.5 mg Tab, Take 2.5 mg by mouth 2 (two) times daily., Disp: , Rfl:     FLUoxetine 10 MG capsule, Take 10 mg by mouth., Disp: , Rfl:     furosemide (LASIX) 20 MG tablet, Take 20 mg by mouth., Disp: , Rfl:     HUMALOG MIX 75-25 KWIKPEN 100 unit/mL (75-25) InPn, SMARTSI Unit(s) SUB-Q, Disp: , Rfl:     hydrALAZINE (APRESOLINE) 25 MG tablet, Take 25 mg by mouth 3 (three) times daily., Disp: , Rfl:     insulin lispro (HUMALOG) 100 unit/mL injection, Inject into the skin 3 (three) times daily before meals., Disp: , Rfl:     lactulose (CHRONULAC) 10 gram/15 mL solution, SMARTSI teaspoon By Mouth Twice Daily, Disp: , Rfl:     LANTUS SOLOSTAR U-100 INSULIN 100 unit/mL (3 mL) InPn pen, Inject 10 Units into the skin every evening., Disp: , Rfl:     lisinopril 10 MG tablet, Take 10 mg by mouth once daily., Disp: , Rfl:     losartan (COZAAR) 100 MG tablet, Take 100 mg by mouth., Disp: , Rfl:     metoprolol tartrate (LOPRESSOR) 100 MG tablet, Take 100 mg by mouth 2 (two) times daily., Disp: , Rfl:     oxyCODONE (ROXICODONE) 5 MG immediate release tablet, Take 1 tablet (5 mg total) by mouth every 4 (four) hours as needed for Pain., Disp: 8 tablet, Rfl: 0    OZEMPIC 0.25 mg or 0.5 mg (2 mg/3 mL) pen injector, , Disp: , Rfl:     pantoprazole (PROTONIX) 40 MG tablet, Take 40 mg by mouth., Disp: , Rfl:     sevelamer carbonate (RENVELA) 800 mg Tab, Take 800 mg by mouth 3 (three) times daily., Disp: , Rfl:     sodium bicarbonate 650 MG tablet, Take 650 mg by mouth 3 (three) times daily., Disp: , Rfl:     sodium zirconium cyclosilicate (LOKELMA) 10 gram packet, Take 10 g by mouth., Disp: , Rfl:     PHYSICAL EXAM:   Right Arm BP - Sitting: 160/77 (01/15/25 1405)  Pulse: 74  Temp: 98.2 °F (36.8 °C)        Extremities:   L 1st stage brachial a/basilic vein AVF with thrill  2+ L brachial and radial pulses     Negative L Ulises's test    LAB  RESULTS:  Lab Results   Component Value Date    K 4.5 10/30/2024    K 5.4 (H) 08/13/2023    K 5.9 (H) 03/13/2023    CREATININE 5.3 (H) 10/30/2024    CREATININE 6.68 (H) 03/13/2023    CREATININE 5.32 (H) 01/25/2023     Lab Results   Component Value Date    WBC 8.10 10/30/2024    WBC 0-5 08/14/2023    WBC 7.8 08/03/2023    HCT 41.3 10/30/2024    HCT 26.3 (L) 08/03/2023    HCT 28.9 (L) 04/04/2014     10/30/2024     (H) 04/04/2014     Lab Results   Component Value Date    HGBA1C 7.8 (H) 08/02/2023    HGBA1C 7.4 (H) 03/13/2023    HGBA1C 7.3 (H) 11/07/2022       IMAGING (I have independently reviewed and interpreted the following tests):  AVF u/s: 2164 ml/min - no stenosis    Prior:  Vein mapping: small cephalic veins x2  Basilic vein, R 2.9mm, L 1.5mm  Brachial veins R 3.8mm, L 3.7mm  Patent brachial arteries x2    IMP/PLAN:     49 y.o. female with a h/o stage V CKD on HD since early 2024, active tobacco use, HLD, HTN, CAD/MI s/p PCIs x2, TIA, and DM II - good thrill and flow in L 1st stage brachial a/basilic AVF  F/u in 3 months with new u/s prior to transposition  Tobacco cessation       Ehsan Melchor MD DFS FACS   Vascular/Endovascular Surgery    Time spent personally reviewing the patient's chart, interpreting images and test, and conferring with physicians was HBtimespent2: 45 mins.

## 2025-01-24 DIAGNOSIS — Z98.890 S/P ARTERIOVENOUS (AV) FISTULA CREATION: Primary | ICD-10-CM

## 2025-04-15 DIAGNOSIS — Z01.818 PRE-OP TESTING: Primary | ICD-10-CM

## 2025-04-16 ENCOUNTER — DOCUMENTATION ONLY (OUTPATIENT)
Dept: VASCULAR SURGERY | Facility: CLINIC | Age: 50
End: 2025-04-16

## 2025-04-16 ENCOUNTER — HOSPITAL ENCOUNTER (OUTPATIENT)
Dept: VASCULAR SURGERY | Facility: CLINIC | Age: 50
Discharge: HOME | End: 2025-04-16
Attending: SURGERY
Payer: MEDICAID

## 2025-04-16 ENCOUNTER — OFFICE VISIT (OUTPATIENT)
Dept: VASCULAR SURGERY | Facility: CLINIC | Age: 50
End: 2025-04-16
Payer: MEDICAID

## 2025-04-16 VITALS
HEIGHT: 66 IN | HEART RATE: 74 BPM | BODY MASS INDEX: 24.1 KG/M2 | DIASTOLIC BLOOD PRESSURE: 80 MMHG | WEIGHT: 149.94 LBS | SYSTOLIC BLOOD PRESSURE: 165 MMHG | TEMPERATURE: 98 F

## 2025-04-16 DIAGNOSIS — N18.6 ESRD ON DIALYSIS: Primary | ICD-10-CM

## 2025-04-16 DIAGNOSIS — N18.6 ESRD (END STAGE RENAL DISEASE): Primary | ICD-10-CM

## 2025-04-16 DIAGNOSIS — Z01.818 PREOP EXAMINATION: Primary | ICD-10-CM

## 2025-04-16 DIAGNOSIS — T82.858A ARTERIOVENOUS FISTULA STENOSIS, INITIAL ENCOUNTER: Primary | ICD-10-CM

## 2025-04-16 DIAGNOSIS — Z99.2 ESRD ON DIALYSIS: Primary | ICD-10-CM

## 2025-04-16 DIAGNOSIS — I77.0 ARTERIOVENOUS FISTULA: ICD-10-CM

## 2025-04-16 PROCEDURE — 99213 OFFICE O/P EST LOW 20 MIN: CPT | Mod: PBBFAC | Performed by: SURGERY

## 2025-04-16 PROCEDURE — 99214 OFFICE O/P EST MOD 30 MIN: CPT | Mod: S$PBB,,, | Performed by: SURGERY

## 2025-04-16 PROCEDURE — 3008F BODY MASS INDEX DOCD: CPT | Mod: CPTII,,, | Performed by: SURGERY

## 2025-04-16 PROCEDURE — 99999 PR PBB SHADOW E&M-EST. PATIENT-LVL III: CPT | Mod: PBBFAC,,, | Performed by: SURGERY

## 2025-04-16 PROCEDURE — 3077F SYST BP >= 140 MM HG: CPT | Mod: CPTII,,, | Performed by: SURGERY

## 2025-04-16 PROCEDURE — 4010F ACE/ARB THERAPY RXD/TAKEN: CPT | Mod: CPTII,,, | Performed by: SURGERY

## 2025-04-16 PROCEDURE — 93990 DOPPLER FLOW TESTING: CPT | Mod: PBBFAC | Performed by: SURGERY

## 2025-04-16 PROCEDURE — 3079F DIAST BP 80-89 MM HG: CPT | Mod: CPTII,,, | Performed by: SURGERY

## 2025-04-16 PROCEDURE — 1159F MED LIST DOCD IN RCRD: CPT | Mod: CPTII,,, | Performed by: SURGERY

## 2025-04-16 NOTE — PROGRESS NOTES
"May Feldman  04/16/2025    HPI:  Patient is a 49 y.o. female with a h/o stage V CKD on HD since early 2024, active tobacco use, HLD, HTN, CAD/MI s/p PCIs x2, TIA, and DM II who is here today for f/u. HD T/R/S via R tunneled cath. Dialysis for almost 1 year. Right hand dominant.     +MI April 2024  Tobacco use: yes, 1/2 pack per day; 35 year smoking hx    12/19/24  L 1st stage brachial artery to basilic vein AVF     Findings/Key Components:  Strong thrill in L 1st stage brachial artery to basilic vein AVF; dilated to 3-4mm  2+ L radial pulses - no augmentation with with AVF compression  Will need transposition in 3+ months    Since surgery she notes numbness in the medial aspect of the forearm that extends down to the fingertips. She feels that her arm is "heavy" but has no issues using the left arm. The arm is full strength. She has not started dialysis with the fistula yet. She denies SOB, chest pain, flank pain, or other symptoms.     No, Primary Doctor   Employment: Disabled      Current Outpatient Medications:     ALPRAZolam (XANAX) 2 MG Tab, Take 2 mg by mouth 2 (two) times daily., Disp: , Rfl:     amLODIPine (NORVASC) 10 MG tablet, Take 10 mg by mouth., Disp: , Rfl:     aspirin (ECOTRIN) 81 MG EC tablet, Take 81 mg by mouth., Disp: , Rfl:     atorvastatin (LIPITOR) 40 MG tablet, Take 40 mg by mouth., Disp: , Rfl:     blood sugar diagnostic Strp, USE ONE test strip UP TO THREE TIMES DAILY, Disp: , Rfl:     buprenorphine-naloxone 8-2 mg (SUBOXONE) 8-2 mg, Place under the tongue 3 (three) times daily., Disp: , Rfl:     calcitRIOL (ROCALTROL) 0.5 MCG Cap, Take by mouth., Disp: , Rfl:     carisoprodoL (SOMA) 350 MG tablet, Take 350 mg by mouth., Disp: , Rfl:     cetirizine (ZYRTEC) 10 MG tablet, Take 10 mg by mouth., Disp: , Rfl:     cloNIDine (CATAPRES) 0.3 MG tablet, Take 0.3 mg by mouth every evening., Disp: , Rfl:     clopidogreL (PLAVIX) 75 mg tablet, Take 75 mg by mouth., Disp: , Rfl:     cyproheptadine " (,PERIACTIN,) 2 mg/5 mL syrup, Take by mouth., Disp: , Rfl:     ELIQUIS 2.5 mg Tab, Take 2.5 mg by mouth 2 (two) times daily., Disp: , Rfl:     FLUoxetine 10 MG capsule, Take 10 mg by mouth., Disp: , Rfl:     furosemide (LASIX) 20 MG tablet, Take 20 mg by mouth., Disp: , Rfl:     HUMALOG MIX 75-25 KWIKPEN 100 unit/mL (75-25) InPn, SMARTSI Unit(s) SUB-Q, Disp: , Rfl:     hydrALAZINE (APRESOLINE) 25 MG tablet, Take 25 mg by mouth 3 (three) times daily., Disp: , Rfl:     insulin lispro (HUMALOG) 100 unit/mL injection, Inject into the skin 3 (three) times daily before meals., Disp: , Rfl:     lactulose (CHRONULAC) 10 gram/15 mL solution, SMARTSI teaspoon By Mouth Twice Daily, Disp: , Rfl:     LANTUS SOLOSTAR U-100 INSULIN 100 unit/mL (3 mL) InPn pen, Inject 10 Units into the skin every evening., Disp: , Rfl:     lisinopril 10 MG tablet, Take 10 mg by mouth once daily., Disp: , Rfl:     losartan (COZAAR) 100 MG tablet, Take 100 mg by mouth., Disp: , Rfl:     metoprolol tartrate (LOPRESSOR) 100 MG tablet, Take 100 mg by mouth 2 (two) times daily., Disp: , Rfl:     oxyCODONE (ROXICODONE) 5 MG immediate release tablet, Take 1 tablet (5 mg total) by mouth every 4 (four) hours as needed for Pain., Disp: 8 tablet, Rfl: 0    OZEMPIC 0.25 mg or 0.5 mg (2 mg/3 mL) pen injector, , Disp: , Rfl:     pantoprazole (PROTONIX) 40 MG tablet, Take 40 mg by mouth., Disp: , Rfl:     sevelamer carbonate (RENVELA) 800 mg Tab, Take 800 mg by mouth 3 (three) times daily., Disp: , Rfl:     sodium bicarbonate 650 MG tablet, Take 650 mg by mouth 3 (three) times daily., Disp: , Rfl:     sodium zirconium cyclosilicate (LOKELMA) 10 gram packet, Take 10 g by mouth., Disp: , Rfl:     PHYSICAL EXAM:   Right Arm BP - Sittin/80 (25 0947)  Pulse: 74  Temp: 98.2 °F (36.8 °C)        Extremities:   L 1st stage brachial a/basilic vein AVF with strong thrill, mild pulsatility proximally  2+ L brachial and radial pulses     Negative L Ulises's  test    LAB RESULTS:  Lab Results   Component Value Date    K 4.5 10/30/2024    K 4.0 08/12/2024    K 3.9 04/03/2024    CREATININE 5.3 (H) 10/30/2024    CREATININE 8.32 (H) 08/12/2024    CREATININE 5.54 (H) 04/03/2024     Lab Results   Component Value Date    WBC 8.10 10/30/2024    WBC 0-5 08/14/2023    WBC 7.8 08/03/2023    HCT 41.3 10/30/2024    HCT 26.3 (L) 08/03/2023    HCT 28.9 (L) 04/04/2014     10/30/2024     (H) 04/04/2014     Lab Results   Component Value Date    HGBA1C 7.8 (H) 08/02/2023    HGBA1C 7.4 (H) 03/13/2023    HGBA1C 7.3 (H) 11/07/2022       IMAGING (I have independently reviewed and interpreted the following tests):  AVF u/s: 1336 ml/min with  cm/s in mid-segment (prior 2164 ml/min - no stenosis)    Prior:  Vein mapping: small cephalic veins x2  Basilic vein, R 2.9mm, L 1.5mm  Brachial veins R 3.8mm, L 3.7mm  Patent brachial arteries x2    IMP/PLAN:     49 y.o. female with a h/o stage V CKD on HD since early 2024, active tobacco use, HLD, HTN, CAD/MI s/p PCIs x2, TIA, and DM II - good thrill and flow in L 1st stage brachial a/basilic AVF - doing well  Next needs L TRA, PTA mid-stenosis, 4/5fr OR11 4/25/25  Then, will need L AVF transposition 5/16/25  Tobacco cessation     Ehsan Melchor MD DFSVS FACS   Vascular/Endovascular Surgery    Time spent personally reviewing the patient's chart, interpreting images and test, and conferring with physicians was HBtimespent2: 45 mins.

## 2025-04-16 NOTE — PROGRESS NOTES
"Pt currently on ozempic and verbalized her last dose was 4/07/25.Pt didn't take dose on 4/14/25 due to "being at my daughter's house and forgot the med at home"the patient informed not to take ozempic on 4/21/25 and she verbalized understanding of information received.     "

## 2025-04-23 ENCOUNTER — TELEPHONE (OUTPATIENT)
Dept: VASCULAR SURGERY | Facility: CLINIC | Age: 50
End: 2025-04-23
Payer: MEDICAID

## 2025-04-23 NOTE — TELEPHONE ENCOUNTER
Spoke with the pt and informed her that the surgery scheduled on 4/25/25 will be cancelled due to an emergency.Pt informed that she will be contacted with a new date when received.Pt verbalized understanding of information received.

## 2025-05-05 ENCOUNTER — TELEPHONE (OUTPATIENT)
Dept: VASCULAR SURGERY | Facility: CLINIC | Age: 50
End: 2025-05-05
Payer: MEDICARE

## 2025-05-05 NOTE — TELEPHONE ENCOUNTER
"Attempted to speak with the pt in reference to new date for fistulogram and change in provider to Dr Watts but pt verbalized "I'm at the store can you call me when I get home"? Informed the pt to contact the clinic at [phone] when she gets home for further discussion. Spoke with Sylvie dialysis nurse and informed her that the pt is scheduled for fistulogram on 5/16/25 with Dr Lubin. Also informed Sylvie that the pt needs to contact the clinic regarding procedure date and change in provider.   "

## 2025-05-07 ENCOUNTER — TELEPHONE (OUTPATIENT)
Dept: VASCULAR SURGERY | Facility: CLINIC | Age: 50
End: 2025-05-07
Payer: MEDICARE

## 2025-05-07 NOTE — PROGRESS NOTES
Spoke with the pt and informed her that Dr Melchor is on sabbatical and will not be returning to [facility].Pt informed that her care has been transferred to Dr Watts and Yuliet Harrell MA will be contacting her with further directives from Dr Watts.Pt verbalized understanding of information received.

## 2025-05-12 ENCOUNTER — TELEPHONE (OUTPATIENT)
Dept: VASCULAR SURGERY | Facility: CLINIC | Age: 50
End: 2025-05-12
Payer: MEDICARE

## 2025-05-12 NOTE — TELEPHONE ENCOUNTER
Spoke to the pt,instructed her to stop taking Eliquis 2 days prior to surgery scheduled on 5/16. Pt will stop on 5/13. Pt stated she understood and the call ended.

## 2025-05-12 NOTE — TELEPHONE ENCOUNTER
----- Message from Neena Watts MD sent at 5/8/2025  6:55 AM CDT -----  Yes, typically hold eliquis for 2 days prior.  ----- Message -----  From: Yuliet Leblanc MA  Sent: 5/7/2025   9:43 AM CDT  To: Neena Watts MD    Good Sonu, Pt May is scheduled for 5/16 @7am. Pt is currently taking ASA, Plavix and Eliquis. Do you want her to stop any of these medication prior to surgery ? I will have her to stop Ozempic 7 days prior .

## 2025-05-15 ENCOUNTER — ANESTHESIA EVENT (OUTPATIENT)
Dept: SURGERY | Facility: HOSPITAL | Age: 50
End: 2025-05-15
Payer: MEDICARE

## 2025-05-15 ENCOUNTER — TELEPHONE (OUTPATIENT)
Dept: VASCULAR SURGERY | Facility: CLINIC | Age: 50
End: 2025-05-15
Payer: MEDICARE

## 2025-05-15 NOTE — ANESTHESIA PREPROCEDURE EVALUATION
[facility]  Anesthesia Pre-Operative Evaluation         Patient Name: May Feldman  YOB: 1975  MRN: [id number]    SUBJECTIVE:     Pre-operative evaluation for Procedure(s) (LRB):  FISTULOGRAM, WITH PTA// TRANSRADIAL (Left)     05/15/2025    May Feldman is a 49 y.o. female w/ a significant PMHx of ESRD on HD, T2DM, MI s/p PCI (), HTN, HLD, HFrEF, Secondary hyperparathyroidism, Vit D deficiency, and anemia of chronic disease .    Patient now presents for the above procedure(s).    No results found for this or any previous visit.       LDA: None documented.       Prev airway: None documented.    Drips: None documented.      Problem List[1]    Review of patient's allergies indicates:   Allergen Reactions    Codeine Itching and Other (See Comments)       Current Inpatient Medications:      Medications Ordered Prior to Encounter[2]    Past Surgical History:   Procedure Laterality Date     SECTION      INSERTION, GRAFT, ARTERIOVENOUS, UPPER EXTREMITY Left 2024    Procedure: INSERTION, GRAFT, ARTERIOVENOUS, UPPER EXTREMITY;  Surgeon: Ehsan Melchor MD;  Location: [facility] OR [address] [place] [address];  Service: Vascular;  Laterality: Left;       Social History[3]    OBJECTIVE:     Vital Signs Range (Last 24H):         Significant Labs:  Lab Results   Component Value Date    WBC 8.10 10/30/2024    HGB 12.7 10/30/2024    HCT 41.3 10/30/2024     10/30/2024    CHOL 127 2024    TRIG 118 2024    HDL 53 2024    ALT 9 (L) 2024    AST 12 2024     2025    K 4.8 2025     2025    CREATININE 5.5 (H) 2025    BUN 39 (H) 2025    CO2 29 2025    TSH 2.03 2023    INR 0.9 2024    HGBA1C 7.8 (H) 2023       Diagnostic Studies: No relevant studies.    EKG:   Results for orders placed or performed during the hospital encounter of 24   SCHEDULED EKG 12-LEAD (to Muse)    Collection Time: 24 12:19 PM    Result Value Ref Range    QRS Duration 124 ms    [facility] QTC Calculation 472 ms    Narrative    Test Reason : Z01.818,    Vent. Rate : 080 BPM     Atrial Rate : 080 BPM     P-R Int : 190 ms          QRS Dur : 124 ms      QT Int : 410 ms       P-R-T Axes : 061 085 021 degrees     QTc Int : 472 ms    Normal sinus rhythm  Right bundle branch block  Abnormal ECG  When compared with ECG of 04-APR-2014 00:53,  Right bundle branch block is now Present  Confirmed by Johnnie Sheikh MD ([id number]) on 11/6/2024 2:50:27 PM    Referred By: GRABIEL IRVING           Confirmed By:Johnnie Sheikh MD       2D ECHO:  TTE:  No results found for this or any previous visit.    KENNY:  No results found for this or any previous visit.    ASSESSMENT/PLAN:           Pre-op Assessment    I have reviewed the Patient Summary Reports.     I have reviewed the Nursing Notes. I have reviewed the NPO Status.   I have reviewed the Medications.     Review of Systems  Anesthesia Hx:  No problems with previous Anesthesia   History of prior surgery of interest to airway management or planning:          Denies Family Hx of Anesthesia complications.    Denies Personal Hx of Anesthesia complications.                    Hematology/Oncology:       -- Denies Anemia:                                  Cardiovascular:     Hypertension    Denies CAD.        Denies CHF.                             Hypertension         Pulmonary:    Denies COPD.  Denies Asthma.                    Renal/:  Chronic Renal Disease        Kidney Function/Disease             Hepatic/GI:      Denies GERD.                Neurological:    Denies CVA.    Denies Seizures.                                Endocrine:  Diabetes    Diabetes                          Physical Exam  General: Well nourished, Cooperative, Alert and Oriented    Airway:  Mouth Opening: Normal  TM Distance: Normal  Tongue: Normal  Neck ROM: Normal ROM    Chest/Lungs:  Clear to auscultation, Normal Respiratory Rate    Heart:  Rate:  Normal  Rhythm: Regular Rhythm        Anesthesia Plan  Type of Anesthesia, risks & benefits discussed:    Anesthesia Type: MAC, Gen Natural Airway  Intra-op Monitoring Plan: Standard ASA Monitors  Post Op Pain Control Plan: multimodal analgesia and IV/PO Opioids PRN  Induction:  IV  Informed Consent: Informed consent signed with the Patient and all parties understand the risks and agree with anesthesia plan.  All questions answered.   ASA Score: 3  Day of Surgery Review of History & Physical: H&P Update referred to the surgeon/provider.    Ready For Surgery From Anesthesia Perspective.     .           [1]   Patient Active Problem List  Diagnosis    ESRD (end stage renal disease)    S/P arteriovenous (AV) fistula creation    Arteriovenous fistula    ESRD on dialysis   [2]   No current facility-administered medications on file prior to encounter.     Current Outpatient Medications on File Prior to Encounter   Medication Sig Dispense Refill    ELIQUIS 2.5 mg Tab Take 2.5 mg by mouth 2 (two) times daily.      ALPRAZolam (XANAX) 2 MG Tab Take 2 mg by mouth 2 (two) times daily.      amLODIPine (NORVASC) 10 MG tablet Take 10 mg by mouth.      aspirin (ECOTRIN) 81 MG EC tablet Take 81 mg by mouth.      atorvastatin (LIPITOR) 40 MG tablet Take 40 mg by mouth.      blood sugar diagnostic Strp USE ONE test strip UP TO THREE TIMES DAILY      buprenorphine-naloxone 8-2 mg (SUBOXONE) 8-2 mg Place under the tongue 3 (three) times daily.      calcitRIOL (ROCALTROL) 0.5 MCG Cap Take by mouth.      carisoprodoL (SOMA) 350 MG tablet Take 350 mg by mouth.      cetirizine (ZYRTEC) 10 MG tablet Take 10 mg by mouth.      cloNIDine (CATAPRES) 0.3 MG tablet Take 0.3 mg by mouth every evening.      clopidogreL (PLAVIX) 75 mg tablet Take 75 mg by mouth. (Patient not taking: Reported on 5/14/2025)      cyproheptadine (,PERIACTIN,) 2 mg/5 mL syrup Take by mouth.      FLUoxetine 10 MG capsule Take 10 mg by mouth.      furosemide (LASIX) 20 MG  tablet Take 20 mg by mouth daily as needed.      HUMALOG MIX 75-25 KWIKPEN 100 unit/mL (75-25) InPn Inject into the skin. Pt reports taking a sliding scale      hydrALAZINE (APRESOLINE) 25 MG tablet Take 25 mg by mouth 3 (three) times daily.      insulin lispro (HUMALOG) 100 unit/mL injection Inject into the skin 3 (three) times daily before meals.      lactulose (CHRONULAC) 10 gram/15 mL solution SMARTSI teaspoon By Mouth Twice Daily      LANTUS SOLOSTAR U-100 INSULIN 100 unit/mL (3 mL) InPn pen Inject 10 Units into the skin every evening.      lisinopril 10 MG tablet Take 10 mg by mouth once daily.      losartan (COZAAR) 100 MG tablet Take 100 mg by mouth.      metoprolol tartrate (LOPRESSOR) 100 MG tablet Take 100 mg by mouth 2 (two) times daily.      pantoprazole (PROTONIX) 40 MG tablet Take 40 mg by mouth.      sevelamer carbonate (RENVELA) 800 mg Tab Take 800 mg by mouth 3 (three) times daily.      sodium bicarbonate 650 MG tablet Take 650 mg by mouth 3 (three) times daily.      sodium zirconium cyclosilicate (LOKELMA) 10 gram packet Take 10 g by mouth. (Patient not taking: Reported on 2025)     [3]   Social History  Socioeconomic History    Marital status: Single   Tobacco Use    Smoking status: Every Day     Current packs/day: 1.00     Average packs/day: 1 pack/day for 25.0 years (25.0 ttl pk-yrs)     Types: Cigarettes   Substance and Sexual Activity    Alcohol use: No    Drug use: No     Social Drivers of Health     Financial Resource Strain: Patient Declined (2024)    Received from [facility]    Overall Financial Resource Strain (CARDIA)     Difficulty of Paying Living Expenses: Patient declined   Food Insecurity: Patient Declined (2024)    Received from [facility]    Hunger Vital Sign     Worried About Running Out of Food in the Last Year: Patient declined     Ran Out of Food in the Last Year: Patient declined   Transportation Needs: Patient Declined (2024)    Received from [facility]    PRAPARE - Transportation     Lack of Transportation (Medical): Patient declined     Lack of Transportation (Non-Medical): Patient declined   Physical Activity: Patient Declined (4/2/2024)    Received from [facility]    Exercise Vital Sign     Days of Exercise per Week: Patient declined     Minutes of Exercise per Session: Patient declined   Stress: Patient Declined (4/2/2024)    Received from [facility] - Occupational Stress Questionnaire     Feeling of Stress : Patient declined

## 2025-05-16 ENCOUNTER — HOSPITAL ENCOUNTER (OUTPATIENT)
Facility: HOSPITAL | Age: 50
Discharge: HOME | End: 2025-05-16
Attending: STUDENT IN AN ORGANIZED HEALTH CARE EDUCATION/TRAINING PROGRAM | Admitting: STUDENT IN AN ORGANIZED HEALTH CARE EDUCATION/TRAINING PROGRAM
Payer: MEDICARE

## 2025-05-16 ENCOUNTER — ANESTHESIA (OUTPATIENT)
Dept: SURGERY | Facility: HOSPITAL | Age: 50
End: 2025-05-16
Payer: MEDICARE

## 2025-05-16 VITALS
HEART RATE: 69 BPM | OXYGEN SATURATION: 100 % | WEIGHT: 145 LBS | HEIGHT: 66 IN | SYSTOLIC BLOOD PRESSURE: 139 MMHG | DIASTOLIC BLOOD PRESSURE: 73 MMHG | TEMPERATURE: 97 F | RESPIRATION RATE: 17 BRPM | BODY MASS INDEX: 23.3 KG/M2

## 2025-05-16 DIAGNOSIS — T82.898S ARTERIOVENOUS FISTULA OCCLUSION, SEQUELA: ICD-10-CM

## 2025-05-16 DIAGNOSIS — N18.6 ESRD (END STAGE RENAL DISEASE): Primary | ICD-10-CM

## 2025-05-16 DIAGNOSIS — Z98.890 S/P ARTERIOVENOUS (AV) FISTULA CREATION: Primary | ICD-10-CM

## 2025-05-16 LAB
POCT GLUCOSE: 66 MG/DL (ref 70–110)
POCT GLUCOSE: 76 MG/DL (ref 70–110)
POCT GLUCOSE: 84 MG/DL (ref 70–110)

## 2025-05-16 PROCEDURE — 36000706: Performed by: STUDENT IN AN ORGANIZED HEALTH CARE EDUCATION/TRAINING PROGRAM

## 2025-05-16 PROCEDURE — 82962 GLUCOSE BLOOD TEST: CPT | Performed by: STUDENT IN AN ORGANIZED HEALTH CARE EDUCATION/TRAINING PROGRAM

## 2025-05-16 PROCEDURE — 71000044 HC DOSC ROUTINE RECOVERY FIRST HOUR: Performed by: STUDENT IN AN ORGANIZED HEALTH CARE EDUCATION/TRAINING PROGRAM

## 2025-05-16 PROCEDURE — 27201423 OPTIME MED/SURG SUP & DEVICES STERILE SUPPLY: Performed by: STUDENT IN AN ORGANIZED HEALTH CARE EDUCATION/TRAINING PROGRAM

## 2025-05-16 PROCEDURE — 37000009 HC ANESTHESIA EA ADD 15 MINS: Performed by: STUDENT IN AN ORGANIZED HEALTH CARE EDUCATION/TRAINING PROGRAM

## 2025-05-16 PROCEDURE — 25000003 PHARM REV CODE 250: Performed by: STUDENT IN AN ORGANIZED HEALTH CARE EDUCATION/TRAINING PROGRAM

## 2025-05-16 PROCEDURE — C1894 INTRO/SHEATH, NON-LASER: HCPCS | Performed by: STUDENT IN AN ORGANIZED HEALTH CARE EDUCATION/TRAINING PROGRAM

## 2025-05-16 PROCEDURE — 63600175 PHARM REV CODE 636 W HCPCS

## 2025-05-16 PROCEDURE — 25500020 PHARM REV CODE 255: Performed by: STUDENT IN AN ORGANIZED HEALTH CARE EDUCATION/TRAINING PROGRAM

## 2025-05-16 PROCEDURE — 63600175 PHARM REV CODE 636 W HCPCS: Performed by: STUDENT IN AN ORGANIZED HEALTH CARE EDUCATION/TRAINING PROGRAM

## 2025-05-16 PROCEDURE — C1769 GUIDE WIRE: HCPCS | Performed by: STUDENT IN AN ORGANIZED HEALTH CARE EDUCATION/TRAINING PROGRAM

## 2025-05-16 PROCEDURE — 37000008 HC ANESTHESIA 1ST 15 MINUTES: Performed by: STUDENT IN AN ORGANIZED HEALTH CARE EDUCATION/TRAINING PROGRAM

## 2025-05-16 PROCEDURE — 25000003 PHARM REV CODE 250

## 2025-05-16 PROCEDURE — 36000707: Performed by: STUDENT IN AN ORGANIZED HEALTH CARE EDUCATION/TRAINING PROGRAM

## 2025-05-16 PROCEDURE — 71000015 HC POSTOP RECOV 1ST HR: Performed by: STUDENT IN AN ORGANIZED HEALTH CARE EDUCATION/TRAINING PROGRAM

## 2025-05-16 PROCEDURE — 71000016 HC POSTOP RECOV ADDL HR: Performed by: STUDENT IN AN ORGANIZED HEALTH CARE EDUCATION/TRAINING PROGRAM

## 2025-05-16 PROCEDURE — C1725 CATH, TRANSLUMIN NON-LASER: HCPCS | Performed by: STUDENT IN AN ORGANIZED HEALTH CARE EDUCATION/TRAINING PROGRAM

## 2025-05-16 RX ORDER — VERAPAMIL HYDROCHLORIDE 2.5 MG/ML
INJECTION INTRAVENOUS
Status: DISCONTINUED | OUTPATIENT
Start: 2025-05-16 | End: 2025-05-16 | Stop reason: HOSPADM

## 2025-05-16 RX ORDER — HEPARIN SODIUM 1000 [USP'U]/ML
INJECTION, SOLUTION INTRAVENOUS; SUBCUTANEOUS
Status: DISCONTINUED | OUTPATIENT
Start: 2025-05-16 | End: 2025-05-16 | Stop reason: HOSPADM

## 2025-05-16 RX ORDER — FENTANYL CITRATE 50 UG/ML
INJECTION, SOLUTION INTRAMUSCULAR; INTRAVENOUS
Status: DISCONTINUED | OUTPATIENT
Start: 2025-05-16 | End: 2025-05-16

## 2025-05-16 RX ORDER — HEPARIN SOD,PORCINE/0.9 % NACL 1000/500ML
INTRAVENOUS SOLUTION INTRAVENOUS
Status: DISCONTINUED | OUTPATIENT
Start: 2025-05-16 | End: 2025-05-16 | Stop reason: HOSPADM

## 2025-05-16 RX ORDER — LIDOCAINE HYDROCHLORIDE 10 MG/ML
INJECTION, SOLUTION INFILTRATION; PERINEURAL
Status: DISCONTINUED | OUTPATIENT
Start: 2025-05-16 | End: 2025-05-16 | Stop reason: HOSPADM

## 2025-05-16 RX ORDER — IODIXANOL 320 MG/ML
INJECTION, SOLUTION INTRAVASCULAR
Status: DISCONTINUED | OUTPATIENT
Start: 2025-05-16 | End: 2025-05-16 | Stop reason: HOSPADM

## 2025-05-16 RX ORDER — ACETAMINOPHEN 500 MG
1000 TABLET ORAL
Status: COMPLETED | OUTPATIENT
Start: 2025-05-16 | End: 2025-05-16

## 2025-05-16 RX ORDER — ONDANSETRON HYDROCHLORIDE 2 MG/ML
4 INJECTION, SOLUTION INTRAVENOUS DAILY PRN
Status: DISCONTINUED | OUTPATIENT
Start: 2025-05-16 | End: 2025-05-16 | Stop reason: HOSPADM

## 2025-05-16 RX ORDER — GLUCAGON 1 MG
1 KIT INJECTION
Status: DISCONTINUED | OUTPATIENT
Start: 2025-05-16 | End: 2025-05-16 | Stop reason: HOSPADM

## 2025-05-16 RX ORDER — SODIUM CHLORIDE 9 MG/ML
INJECTION, SOLUTION INTRAVENOUS CONTINUOUS
Status: DISCONTINUED | OUTPATIENT
Start: 2025-05-16 | End: 2025-05-16 | Stop reason: HOSPADM

## 2025-05-16 RX ORDER — SODIUM CHLORIDE 0.9 % (FLUSH) 0.9 %
10 SYRINGE (ML) INJECTION
Status: DISCONTINUED | OUTPATIENT
Start: 2025-05-16 | End: 2025-05-16 | Stop reason: HOSPADM

## 2025-05-16 RX ORDER — HEPARIN SODIUM 1000 [USP'U]/ML
INJECTION, SOLUTION INTRAVENOUS; SUBCUTANEOUS
Status: DISCONTINUED | OUTPATIENT
Start: 2025-05-16 | End: 2025-05-16

## 2025-05-16 RX ORDER — MIDAZOLAM HYDROCHLORIDE 1 MG/ML
INJECTION INTRAMUSCULAR; INTRAVENOUS
Status: DISCONTINUED | OUTPATIENT
Start: 2025-05-16 | End: 2025-05-16

## 2025-05-16 RX ORDER — DEXMEDETOMIDINE HYDROCHLORIDE 100 UG/ML
INJECTION, SOLUTION INTRAVENOUS
Status: DISCONTINUED | OUTPATIENT
Start: 2025-05-16 | End: 2025-05-16

## 2025-05-16 RX ORDER — NITROGLYCERIN 5 MG/ML
INJECTION, SOLUTION INTRAVENOUS
Status: DISCONTINUED | OUTPATIENT
Start: 2025-05-16 | End: 2025-05-16 | Stop reason: HOSPADM

## 2025-05-16 RX ADMIN — DEXMEDETOMIDINE 8 MCG: 100 INJECTION, SOLUTION, CONCENTRATE INTRAVENOUS at 07:05

## 2025-05-16 RX ADMIN — FENTANYL CITRATE 25 MCG: 50 INJECTION, SOLUTION INTRAMUSCULAR; INTRAVENOUS at 07:05

## 2025-05-16 RX ADMIN — ACETAMINOPHEN 1000 MG: 500 TABLET ORAL at 06:05

## 2025-05-16 RX ADMIN — MIDAZOLAM HYDROCHLORIDE 2 MG: 2 INJECTION, SOLUTION INTRAMUSCULAR; INTRAVENOUS at 07:05

## 2025-05-16 RX ADMIN — MIDAZOLAM HYDROCHLORIDE 1 MG: 2 INJECTION, SOLUTION INTRAMUSCULAR; INTRAVENOUS at 07:05

## 2025-05-16 RX ADMIN — HEPARIN SODIUM 3000 UNITS: 1000 INJECTION, SOLUTION INTRAVENOUS; SUBCUTANEOUS at 07:05

## 2025-05-16 NOTE — H&P
FOCUSED SURGICAL H&P    May Feldman is a 49 y.o. female. MRN is [id number].    CC: Here today for the following surgical procedure(s):  FISTULOGRAM, WITH PTA// TRANSRADIAL (Left)    HPI: For a detailed history of the patients history of present illness please refer to the last progress note. In brief, this is a 49 y.o. female with a known history of   L 1st stage brachial a/basilic vein AVF with strong thril , here today for surgical intervention. There has been no recent changes in the patients health, including fevers, chest pain, or shortness of breath, and no new medications have been started. The patient has not had anything to eat or drink for the last 8 hours. The patient has held all blood thinners.       Past Medical History:   Past Medical History:   Diagnosis Date    Diabetes mellitus     Hypertension        Past Surgical History:   Past Surgical History:   Procedure Laterality Date     SECTION      INSERTION, GRAFT, ARTERIOVENOUS, UPPER EXTREMITY Left 2024    Procedure: INSERTION, GRAFT, ARTERIOVENOUS, UPPER EXTREMITY;  Surgeon: Ehsan Melchor MD;  Location: [facility] OR [address] [place] [address];  Service: Vascular;  Laterality: Left;       Social History: Social History[1]    Family History: No family history on file.       Allergies:  Review of patient's allergies indicates:   Allergen Reactions    Codeine Itching and Other (See Comments)         Medications:  Current Medications[2]                  Vital Signs:  Vitals:    25 0625   BP:    Pulse: 75   Resp:    Temp:          Physical Exam:  Neuro: awake, alert, no acute distress.  HEENT: PERRLA, neck supple, no lymphadenopathy.  Heart: regular rate/rhythm  Lungs: equal chest expansion bilaterally, no increased work of breathing on RA  Abdomen: soft, non-distended, non-tender to palpation.  Extremities: warm, well-perfused. L AVF with palpable thrill       Labs:  Lab Results   Component Value Date/Time    WBC 8.10 10/30/2024 11:30 AM    HGB 12.7  "10/30/2024 11:30 AM    HCT 41.3 10/30/2024 11:30 AM     10/30/2024 11:30 AM    MCV 99 (H) 10/30/2024 11:30 AM     Lab Results   Component Value Date/Time     04/16/2025 11:21 AM     10/30/2024 11:30 AM    K 4.8 04/16/2025 11:21 AM    K 4.5 10/30/2024 11:30 AM     04/16/2025 11:21 AM     10/30/2024 11:30 AM    CO2 29 04/16/2025 11:21 AM    CO2 25 10/30/2024 11:30 AM    BUN 39 (H) 04/16/2025 11:21 AM     (H) 04/16/2025 11:21 AM     (H) 10/30/2024 11:30 AM     Lab Results   Component Value Date/Time    INR 0.9 08/12/2024 01:42 PM     No components found for: "TROPI"  Lab Results   Component Value Date/Time    ALT 9 (L) 08/12/2024 01:42 PM    ALT 12 04/04/2014 02:03 AM    AST 12 08/12/2024 01:42 PM    AST 13 04/04/2014 02:03 AM    LIPASE 48 04/01/2024 04:44 PM          Assessment/Plan:  49 y.o. female here today for the following surgical procedure:    FISTULOGRAM, WITH PTA// TRANSRADIAL (Left)       The indications for surgery, highlighting the risks and benefits of the procedure were discussed with the patient. These included but are not limited to swelling, bleeding, pain, infection, and adverse anesthesia-related event. I also discussed risk of injury to nearby structures. The patient seems to understand the risks, as well as the alternatives including nonoperative observation/survellience and wishes to proceed with the surgical intervention.    Patient has been examined, consented, and marked for laterality.      Plan discussed with and agreed by Dr. Stefanie Hodge MD  General Surgery PGY-1  05/16/2025           [1]   Social History  Socioeconomic History    Marital status: Single   Tobacco Use    Smoking status: Every Day     Current packs/day: 1.00     Average packs/day: 1 pack/day for 25.0 years (25.0 ttl pk-yrs)     Types: Cigarettes   Substance and Sexual Activity    Alcohol use: No    Drug use: No     Social Drivers of Health     Financial Resource " Strain: Patient Declined (4/2/2024)    Received from [facility]    Overall Financial Resource Strain (CARDIA)     Difficulty of Paying Living Expenses: Patient declined   Food Insecurity: Patient Declined (4/2/2024)    Received from [facility]    Hunger Vital Sign     Worried About Running Out of Food in the Last Year: Patient declined     Ran Out of Food in the Last Year: Patient declined   Transportation Needs: Patient Declined (4/2/2024)    Received from [facility]    PRAPARE - Transportation     Lack of Transportation (Medical): Patient declined     Lack of Transportation (Non-Medical): Patient declined   Physical Activity: Patient Declined (4/2/2024)    Received from [facility]    Exercise Vital Sign     Days of Exercise per Week: Patient declined     Minutes of Exercise per Session: Patient declined   Stress: Patient Declined (4/2/2024)    Received from [facility]    [place] of Occupational Health - Occupational Stress Questionnaire     Feeling of Stress : Patient declined   [2]   Current Facility-Administered Medications:     0.9% NaCl infusion, , Intravenous, Continuous, Sisi Hodge MD

## 2025-05-16 NOTE — ANESTHESIA POSTPROCEDURE EVALUATION
Anesthesia Post Evaluation    Patient: May Feldman    Procedure(s) Performed: Procedure(s) (LRB):  FISTULOGRAM, WITH PTA// TRANSRADIAL (Left)    Final Anesthesia Type: MAC      Patient location during evaluation: PACU  Patient participation: Yes- Able to Participate  Level of consciousness: awake and alert and oriented  Post-procedure vital signs: reviewed and stable  Pain management: adequate  Airway patency: patent    PONV status at discharge: No PONV  Anesthetic complications: no      Cardiovascular status: blood pressure returned to baseline and hemodynamically stable  Respiratory status: unassisted  Hydration status: euvolemic  Follow-up not needed.              Vitals Value Taken Time   /73 05/16/25 10:32   Temp 36.3 °C (97.3 °F) 05/16/25 10:00   Pulse 70 05/16/25 10:38   Resp 49 05/16/25 10:38   SpO2 84 % 05/16/25 10:38   Vitals shown include unfiled device data.      No case tracking events are documented in the log.      Pain/Kaylen Score: Pain Rating Prior to Med Admin: 0 (5/16/2025  6:23 AM)  Kaylen Score: 10 (5/16/2025 10:45 AM)

## 2025-05-16 NOTE — INTERVAL H&P NOTE
The patient has been examined and the H&P has been reviewed:    I concur with the findings and changes have been noted since the H&P was written: I met Ms. Feldman in the preoperative holding area to introduce myself, as I will be taking over her care. We discussed the rationale for the fistulagram, as she has a focal stenosis on her US and the intent was to treat it prior to superficialization. We discussed risks and benefits of surgery including but not limited to death, pain, bleeding, scarring, infection, loss of the fistula, damage to the blood vessels, need for open surgery and limb loss. She tells me she last took her eliquis 2 days ago and has been holding it since. Will proceed with a left arm fistulagram possible intervention with plans for a second stage transposition at a later date.    Surgery risks, benefits and alternative options discussed and understood by patient/family.          There are no hospital problems to display for this patient.

## 2025-05-16 NOTE — TRANSFER OF CARE
"Anesthesia Transfer of Care Note    Patient: May Feldman    Procedure(s) Performed: Procedure(s) (LRB):  FISTULOGRAM, WITH PTA// TRANSRADIAL (Left)    Patient location: PACU    Anesthesia Type: MAC    Transport from OR: Transported from OR on 2-3 L/min O2 by NC with adequate spontaneous ventilation    Post pain: adequate analgesia    Post assessment: no apparent anesthetic complications    Post vital signs: stable    Level of consciousness: awake and alert    Nausea/Vomiting: no nausea/vomiting    Complications: none    Transfer of care protocol was followed      Last vitals: Visit Vitals  BP (!) 174/83   Pulse 75   Temp 36.5 °C (97.7 °F) (Temporal)   Resp 16   Ht 5' 6" (1.676 m)   Wt 65.8 kg (145 lb)   SpO2 100%   Breastfeeding No   BMI 23.40 kg/m²     "

## 2025-05-16 NOTE — DISCHARGE INSTRUCTIONS
Transradial access  Lifting and activity restrictions depend on the insertion site for the procedure:  - Wrist or hand:   - Do not lift more than 5 pounds for the first 24 hours and do not lift more than 10 pounds with the affected arm for 1 week.   - Do not use affected arm for pulling up in bed, adjusting position, or weight bearing exercise.   - Avoid flexing the wrist, such as hammering, playing tennis, or swinging objects.   - Avoid moisture or submerging hand for 7 days (tub, swimming, dishes). Showering is OK.    Fistulogram Discharge instructions:  1. Keep your dressing dry for 24 hours.  2. After 24 hours, you may remove the bandage and clean the area with soapy water.  3. The sutures at the site may be removed by the dialysis unit or return to the surgery clinic in 3-4 days to have them removed.  4. Avoid heavy lifting (over 10 lbs) and strenuous activity with the affected arm for 2 days.  5. Inspect the puncture site daily for any signs of redness, swelling or drainage. Return to ER for any signs of infection, numbess, loss of feeling or pale skin to arm or hand below the procedure site        Post Sedation Discharge Instructions     1. On the day of your procedure, remain in bed and only get up to go to the bathroom until the following day.  2. The day after your procedure, you may gradually resume your normal activities.   3. Do not drive, operate machinery, sign legal documents, cook or care for young children for 24 hours after your procedure.  4. Return to the ER for any shortness of breath, difficulty breathing, temperature of 101 or greater or any severe pain unrelieved by pain medicine.          Recovery After Procedural Sedation (Adult)  You have been given medicine by vein to make you sleep during your surgery. This may have included both a pain medicine and sleeping medicine. Most of the effects have worn off. But you may still have some drowsiness for the next 6 to 8 hours.  Home care  Follow  these guidelines when you get home:  For the next 8 hours, you should be watched by a responsible adult. This person should make sure your condition is not getting worse.  Don't drink any alcohol for the next 24 hours.  Don't drive, operate dangerous machinery, or make important business or personal decisions during the next 24 hours.  Do not make any important decisions for 24 hours  Note: Your healthcare provider may tell you not to take any medicine by mouth for pain or sleep in the next 4 hours. These medicines may react with the medicines you were given in the hospital. This could cause a much stronger response than usual.  Follow-up care  Follow up with your healthcare provider if you are not alert and back to your usual level of activity within 12 hours.  When to seek medical advice  Call your healthcare provider right away if any of these occur:  Drowsiness gets worse  Weakness or dizziness gets worse  Repeated vomiting  You can't be awakened
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
## 2025-05-16 NOTE — PROGRESS NOTES
Patient prepped for surgery. Cousin at bedside. Belongings to be placed in locker. Awaiting surgery team.

## 2025-05-16 NOTE — PLAN OF CARE
Patient being discharged to home in the care of her cousinKaity. Pt alert and talkative, vitals stable on room air, tolerating PO intake, reports lower back pain as tolerable. Discharge instructions (written and verbal) and follow-up information given to patient who verbalized understanding, as well as a readiness for discharge. [facility] contact info provided for additional questions following discharge.    L radial TR Band removed without incident, gauze and tegaderm dressing applied.     Pt instructed to resume her Eliquis on tomorrow per Dr. Watts. She verbalized understand of this.     Currently awaiting the arrival of pt's ride home.

## 2025-05-16 NOTE — BRIEF OP NOTE
Emre Stafford - Surgery ([facility])  Brief Operative Note    Surgery Date: 5/16/2025     Surgeons and Role:     * Neena Watts MD - Primary     * Faizan Yun MD - Fellow    Assisting Surgeon: None    Pre-op Diagnosis:  Arteriovenous fistula stenosis, initial encounter [T82.858A]    Post-op Diagnosis:  Post-Op Diagnosis Codes:     * Arteriovenous fistula stenosis, initial encounter [T82.858A]    Procedure(s) (LRB):  Ultrasound guided access L radial artery   PTA mid AVF 7x80mm Browning     Anesthesia: Local MAC    Operative Findings:   >70% stenosis in mid AVF, PTA up to 7mm, <30% residual, good thrill after, no central stenosis.     Estimated Blood Loss: 10cc    Specimens:   N/A         Discharge Note    OUTCOME: Patient tolerated treatment/procedure well without complication and is now ready for discharge.    DISPOSITION: Home or Self Care    FINAL DIAGNOSIS:  <principal problem not specified>    FOLLOWUP: In clinic in 1 week with HD U/S, and to schedule transposition.     DISCHARGE INSTRUCTIONS:   Transradial access  Lifting and activity restrictions depend on the insertion site for the procedure:  - Wrist or hand:   - Do not lift more than 5 pounds for the first 24 hours and do not lift more than 10 pounds with the affected arm for 1 week.   - Do not use affected arm for pulling up in bed, adjusting position, or weight bearing exercise.   - Avoid flexing the wrist, such as hammering, playing tennis, or swinging objects.   - Avoid moisture or submerging hand for 7 days (tub, swimming, dishes). Showering is OK.    Fistulogram Discharge instructions:  1. Keep your dressing dry for 24 hours.  2. After 24 hours, you may remove the bandage and clean the area with soapy water.  3. The sutures at the site may be removed by the dialysis unit or return to the surgery clinic in 3-4 days to have them removed.  4. Avoid heavy lifting (over 10 lbs) and strenuous activity with the affected arm for 2 days.  5. Inspect the  puncture site daily for any signs of redness, swelling or drainage. Return to ER for any signs of infection, numbess, loss of feeling or pale skin to arm or hand below the procedure site        Post Sedation Discharge Instructions     1. On the day of your procedure, remain in bed and only get up to go to the bathroom until the following day.  2. The day after your procedure, you may gradually resume your normal activities.   3. Do not drive, operate machinery, sign legal documents, cook or care for young children for 24 hours after your procedure.  4. Return to the ER for any shortness of breath, difficulty breathing, temperature of 101 or greater or any severe pain unrelieved by pain medicine.          Recovery After Procedural Sedation (Adult)  You have been given medicine by vein to make you sleep during your surgery. This may have included both a pain medicine and sleeping medicine. Most of the effects have worn off. But you may still have some drowsiness for the next 6 to 8 hours.  Home care  Follow these guidelines when you get home:  For the next 8 hours, you should be watched by a responsible adult. This person should make sure your condition is not getting worse.  Don't drink any alcohol for the next 24 hours.  Don't drive, operate dangerous machinery, or make important business or personal decisions during the next 24 hours.  Do not make any important decisions for 24 hours  Note: Your healthcare provider may tell you not to take any medicine by mouth for pain or sleep in the next 4 hours. These medicines may react with the medicines you were given in the hospital. This could cause a much stronger response than usual.  Follow-up care  Follow up with your healthcare provider if you are not alert and back to your usual level of activity within 12 hours.  When to seek medical advice  Call your healthcare provider right away if any of these occur:  Drowsiness gets worse  Weakness or dizziness gets  worse  Repeated vomiting  You can't be awakened

## 2025-05-19 NOTE — OP NOTE
Emre Stafford - Surgery ([facility])  Vascular Surgery  Operative Note    SUMMARY     Date of Procedure: 5/16/2025     Procedure: Procedure(s) (LRB):  FISTULOGRAM, WITH PTA// TRANSRADIAL (Left)     Surgeons and Role:     * Neena Watts MD - Primary     * Faizan Yun MD - Fellow    Assisting Surgeon: None    Pre-Operative Diagnosis: Arteriovenous fistula stenosis, initial encounter [T82.858A]    Post-Operative Diagnosis: Post-Op Diagnosis Codes:     * Arteriovenous fistula stenosis, initial encounter [T82.858A]    Anesthesia: Local MAC    Procedure Performed:  Ultrasound guided access of the left radial artery  PTA of the mid fistula using a 7x80mm Briscoe balloon    Operative Findings (including complications, if any): Greater than 70% stenosis of the mid AVF, successfully ballooned with a 7x80mm Briscoe balloon with less than 30% residual stenosis.    Description of Technical Procedures: 50 yo female with a past medical history for ESRD on HD. She had previously undergone placement of a left upper extremity brachiobasilic arteriovenous fistula that had taken a prolonged time to mature. She had been recommended to undergo fistulagram with possible intervention to treat a mid-fistula stenosis seen on ultrasound. Risks and benefits were discussed with the patient including but not limited to death, pain, bleeding, loss of the fistula, failure of the fistula to mature, embolism, limb loss, infection and need for further surgery. Informed consent was signed.    The patient was identified in the preoperative holding area and marked appropriately. She was brought to the operating suite where she was placed in the supine position. MAC anesthesia was administered. Ultrasound was used to access the radial artery with a micropuncture kit. Wire was confirmed in the artery on ultrasound and with Xray. The radial artery cocktail was used. A diagnostic fistulagram showed a mid-fistula stenosis of at least 60-70%. It also showed  patent central veins. We then advanced an .035 Glidewire across the fistula into the axillary/subclavian vein. A 4/5 slender sheath was placed in the radial artery. We first ballooned with a 5mm balloon and then a 7mm [place] balloon across the entire stenosis with good resolution of less than a 30% stenosis after treatment. As such, we ended the procedure there. A TR band was placed for hemostasis. She tolerated the procedure well and without issue. She was delivered to PACU in stable condition.    Significant Surgical Tasks Conducted by the Assistant(s), if Applicable: None    Estimated Blood Loss (EBL): * No values recorded between 5/16/2025  6:47 AM and 5/16/2025  7:53 AM *           Implants: * No implants in log *    Specimens:   Specimen (24h ago, onward)      None                    Condition: Good    Disposition: PACU - hemodynamically stable.    Attestation: I was present and scrubbed for the entire procedure.

## 2025-05-23 ENCOUNTER — HOSPITAL ENCOUNTER (OUTPATIENT)
Dept: VASCULAR SURGERY | Facility: CLINIC | Age: 50
Discharge: HOME | End: 2025-05-23
Attending: STUDENT IN AN ORGANIZED HEALTH CARE EDUCATION/TRAINING PROGRAM
Payer: MEDICARE

## 2025-05-23 ENCOUNTER — OFFICE VISIT (OUTPATIENT)
Dept: VASCULAR SURGERY | Facility: CLINIC | Age: 50
End: 2025-05-23
Attending: STUDENT IN AN ORGANIZED HEALTH CARE EDUCATION/TRAINING PROGRAM
Payer: MEDICARE

## 2025-05-23 VITALS
HEIGHT: 67 IN | BODY MASS INDEX: 23.53 KG/M2 | SYSTOLIC BLOOD PRESSURE: 159 MMHG | DIASTOLIC BLOOD PRESSURE: 76 MMHG | WEIGHT: 149.94 LBS | HEART RATE: 71 BPM | TEMPERATURE: 98 F

## 2025-05-23 DIAGNOSIS — N18.6 ESRD (END STAGE RENAL DISEASE) ON DIALYSIS: Primary | ICD-10-CM

## 2025-05-23 DIAGNOSIS — Z99.2 ESRD (END STAGE RENAL DISEASE) ON DIALYSIS: Primary | ICD-10-CM

## 2025-05-23 DIAGNOSIS — N18.6 ESRD (END STAGE RENAL DISEASE): ICD-10-CM

## 2025-05-23 PROCEDURE — 99214 OFFICE O/P EST MOD 30 MIN: CPT | Mod: PBBFAC | Performed by: STUDENT IN AN ORGANIZED HEALTH CARE EDUCATION/TRAINING PROGRAM

## 2025-05-23 PROCEDURE — 99999 PR PBB SHADOW E&M-EST. PATIENT-LVL IV: CPT | Mod: PBBFAC,,, | Performed by: STUDENT IN AN ORGANIZED HEALTH CARE EDUCATION/TRAINING PROGRAM

## 2025-05-23 PROCEDURE — 93990 DOPPLER FLOW TESTING: CPT | Mod: PBBFAC | Performed by: STUDENT IN AN ORGANIZED HEALTH CARE EDUCATION/TRAINING PROGRAM

## 2025-05-23 PROCEDURE — 93990 DOPPLER FLOW TESTING: CPT | Mod: 26,S$PBB,, | Performed by: STUDENT IN AN ORGANIZED HEALTH CARE EDUCATION/TRAINING PROGRAM

## 2025-05-23 NOTE — H&P (VIEW-ONLY)
May Feldman  05/23/2025    HPI:  Patient is a 49 y.o. female with a h/o stage V CKD on HD since early 2024, active tobacco use, HLD, HTN, CAD/MI s/p PCIs x2, TIA, and DM II who is here today for f/u. HD T/R/S via R tunneled cath. Dialysis for almost 1 year. Right hand dominant.     +MI April 2024  Tobacco use: yes, 1/2 pack per day; 35 year smoking hx    12/19/24  L 1st stage brachial artery to basilic vein AVF     INTERVAL UPDATE    She presents to clinic today for follow up status post left transradial fistulogram with angioplasty of the mid AVF using a 7 x 80 mustang balloon with good resolution.  She has had no issues since her surgery.    No, Primary Doctor   Employment: Disabled      Current Outpatient Medications:     ALPRAZolam (XANAX) 2 MG Tab, Take 2 mg by mouth 2 (two) times daily., Disp: , Rfl:     amLODIPine (NORVASC) 10 MG tablet, Take 10 mg by mouth., Disp: , Rfl:     aspirin (ECOTRIN) 81 MG EC tablet, Take 81 mg by mouth., Disp: , Rfl:     atorvastatin (LIPITOR) 40 MG tablet, Take 40 mg by mouth., Disp: , Rfl:     blood sugar diagnostic Strp, USE ONE test strip UP TO THREE TIMES DAILY, Disp: , Rfl:     buprenorphine-naloxone 8-2 mg (SUBOXONE) 8-2 mg, Place under the tongue 3 (three) times daily., Disp: , Rfl:     calcitRIOL (ROCALTROL) 0.5 MCG Cap, Take by mouth., Disp: , Rfl:     carisoprodoL (SOMA) 350 MG tablet, Take 350 mg by mouth., Disp: , Rfl:     cetirizine (ZYRTEC) 10 MG tablet, Take 10 mg by mouth., Disp: , Rfl:     cloNIDine (CATAPRES) 0.3 MG tablet, Take 0.3 mg by mouth every evening., Disp: , Rfl:     clopidogreL (PLAVIX) 75 mg tablet, Take 75 mg by mouth., Disp: , Rfl:     cyproheptadine (,PERIACTIN,) 2 mg/5 mL syrup, Take by mouth., Disp: , Rfl:     ELIQUIS 2.5 mg Tab, Take 2.5 mg by mouth 2 (two) times daily., Disp: , Rfl:     FLUoxetine 10 MG capsule, Take 10 mg by mouth., Disp: , Rfl:     furosemide (LASIX) 20 MG tablet, Take 20 mg by mouth daily as needed., Disp: , Rfl:      HUMALOG MIX 75-25 KWIKPEN 100 unit/mL (75-25) InPn, Inject into the skin. Pt reports taking a sliding scale, Disp: , Rfl:     hydrALAZINE (APRESOLINE) 25 MG tablet, Take 25 mg by mouth 3 (three) times daily., Disp: , Rfl:     insulin lispro (HUMALOG) 100 unit/mL injection, Inject into the skin 3 (three) times daily before meals., Disp: , Rfl:     lactulose (CHRONULAC) 10 gram/15 mL solution, SMARTSI teaspoon By Mouth Twice Daily, Disp: , Rfl:     LANTUS SOLOSTAR U-100 INSULIN 100 unit/mL (3 mL) InPn pen, Inject 10 Units into the skin every evening., Disp: , Rfl:     lisinopril 10 MG tablet, Take 10 mg by mouth once daily., Disp: , Rfl:     losartan (COZAAR) 100 MG tablet, Take 100 mg by mouth., Disp: , Rfl:     metoprolol tartrate (LOPRESSOR) 100 MG tablet, Take 100 mg by mouth 2 (two) times daily., Disp: , Rfl:     pantoprazole (PROTONIX) 40 MG tablet, Take 40 mg by mouth., Disp: , Rfl:     sevelamer carbonate (RENVELA) 800 mg Tab, Take 800 mg by mouth 3 (three) times daily., Disp: , Rfl:     sodium bicarbonate 650 MG tablet, Take 650 mg by mouth 3 (three) times daily., Disp: , Rfl:     sodium zirconium cyclosilicate (LOKELMA) 10 gram packet, Take 10 g by mouth., Disp: , Rfl:     PHYSICAL EXAM:   Right Arm BP - Sittin/76 (25 0950)  Pulse: 71  Temp: 98.3 °F (36.8 °C)        Extremities:   L 1st stage brachial a/basilic vein AVF with strong thrill, mild pulsatility proximally  2+ L brachial and radial pulses     Negative L Ulises's test    LAB RESULTS:  Lab Results   Component Value Date    K 4.8 2025    K 4.5 10/30/2024    K 4.0 2024    CREATININE 5.5 (H) 2025    CREATININE 5.3 (H) 10/30/2024    CREATININE 8.32 (H) 2024     Lab Results   Component Value Date    WBC 8.10 10/30/2024    WBC 0-5 2023    WBC 7.8 2023    HCT 41.3 10/30/2024    HCT 26.3 (L) 2023    HCT 28.9 (L) 2014     10/30/2024     (H) 2014     Lab Results   Component  Value Date    HGBA1C 7.8 (H) 08/02/2023    HGBA1C 7.4 (H) 03/13/2023    HGBA1C 7.3 (H) 11/07/2022       IMAGING (I have independently reviewed and interpreted the following tests):  Dialysis access evaluation shows flows of greater than 2 L, in diameters of 6.2 proximally, 7.1 at the midportion and 6.5 distally.    IMP/PLAN:     49 y.o. female with a h/o stage V CKD on HD since early 2024, active tobacco use, HLD, HTN, CAD/MI s/p PCIs x2, TIA, and DM II - good thrill and flow in L 1st stage brachial a/basilic AVF - doing well     She is ready for a second-stage brachiobasilic superficialization.  We discussed the procedure in detail in clinic today.  I explained that I would make an incision along her arm to try to elevate the vein and dissected away from any surrounding structures.  We discussed risks of the procedure including but not limited to death, pain, bleeding, loss of the fistula, scarring, wound healing complications, need for further surgery.  Informed consent was signed.  We will set her up in the near future for a left upper extremity second-stage brachiobasilic.  She will need to hold her blood thinners for at least 2 days preop.

## 2025-06-06 ENCOUNTER — TELEPHONE (OUTPATIENT)
Dept: VASCULAR SURGERY | Facility: CLINIC | Age: 50
End: 2025-06-06
Payer: MEDICARE

## 2025-06-06 NOTE — PRE-PROCEDURE INSTRUCTIONS
PreOp Instructions given:     -- Medication information (what to hold and what to take)   -- NPO guidelines as follows: (or as per your Surgeon)  Stop ALL solid food, gum, candy 8 hours before arrival time.  Stop all CLOUDY liquids: coffee with creamer, cloudy juices, 8 hours prior to arrival time.  The patient should be ENCOURAGED to drink carbohydrate-rich clear liquids (sports drinks, clear juices) until 2 hours prior to arrival time.  Stop clear liquids 2 hours prior to arrival time.  CLEAR liquids include water, black coffee NO creamer, clear oral rehydration drinks, clear sports drinks and clear fruit juices (no orange juice, no pulpy juices, no apple cider).   IF IN DOUBT, drink water instead.   NOTHING TO DRINK 2 hours before to surgery/procedure  time. If you are told to take medication on the morning of surgery, it may be taken with a sip of water.   -- *Arrival place and directions given*.  Time to be given the day before procedure by the Surgeon's Office   -- Bathe with antibacterial soap (dial or Hibiclens as instructed)  -- Don't wear any jewelry or valuables and not metals on skin or hair AM of surgery   -- No makeup or moisturizer to face   -- No perfume/cologne, powder, lotions, aftershave or deodorant     Pt verbalized understanding.            *If going to , see below:      Directions and Instructions for [facility] [place]   At [facility], we have an outstanding team of physicians, anesthesiologists, CRNAs, Registered Nurses, Surgical Technologists, and other ancillary team members all focused on your surgical and procedural care.   Before Your Procedure:   The physician's office will call you with a specific arrival time and directions a day or two before your scheduled procedure. You may also receive these instructions through your [facility] portal.   Day of Procedure:   Please be sure to arrive at the arrival time given or you may risk your surgery being delayed  or canceled. The arrival time is earlier than your scheduled surgery or procedure time. In the winter months please dress warm and bring blankets for you or your child as the waiting room may be cold. If you have difficulty locating the facility, please give us a call at [phone].   Directions:   The [facility] is located on the 1st floor of the hospital building near the [place] entrance.   Parking:   You will park in the [facility] Parking Garage (note location on map). [facility] opens at 5:00 a.m. and has a drop off area by the entrance.  parking is available starting at 7:00 a.m. Please see below for further  parking instructions.   Directions from the parking garage elevators   Blue [facility] Elevators: From the parking garage, take the blue [place] elevators (located in the center of the parking garage) to the 1st floor of the garage. You will then take a right once off the elevators then another right to the outside of the parking garage. You will be across from the [facility]. You will walk down the sidewalk, pass the  curve at the [place] entrance and continue to follow the sidewalk. You will pass the radiation oncology entrance on your right. Continue to follow the sidewalk to the [facility] glass door entrance.   Hospital Entrance (Inside Route): If a mostly inside route is preferred: Take the inside elevator bank (located at the far north end of the garage) from the parking garage to the 1st floor. On the 1st floor walk past [employer]. Keep walking down the center of the hallway towards the hospital elevators. Once you reach the red brick gianna, take a left and go past the hospital elevators. Take another left and follow the blue and white [place] signs around the hallway to the end. Go outside of the door. You will see the [facility] entrance to your right.   Drop Off:   There is a drop off area  at the doors of the [facility] for your convenience. If utilized for pediatric patients, an adult must accompany the patient into the surgery center while another adult farah the vehicle.   [place] (at 7:00 a.m.):   Upon check-in, please let the  know that you are utilizing [facility] parking which is free. The . will then call  for your car to be picked up. Your keys and phone number will be collected and given to [employer] services. You will then be given a ticket. Upon discharge,  will be notified to bring your vehicle back when you are ready.           Directions to [facility] [place]:  [phone]     From 1st floor garage elevators: go past [facility] [employer] shop. Look for Black piano on side of coffee shop. Take Atrium (gold) elevator by piano up to 2nd floor. When you exit elevator follow long hallway (you should see a sign hanging from the ceiling that says Day of Surgery Family Waiting Room. When hallway ends you will be entering the day of surgery waiting area. Check in at the desk for your procedure.      From [facility] entrance: Make a right after you enter the door to the hospital. On your Left, take [place] elevator to 2nd floor. Check in at desk      From Lab desk on 2nd floor: Exit lab area toward hospital atrium. You should see a sign that says Day of Surgery Family Waiting Area. Make a Left and follow long hallway (you should see a sign hanging from the ceiling that says Day of Surgery Family Waiting Room. When hallway ends you will be entering the day of surgery waiting area. Check in at the desk for your procedure.

## 2025-06-09 ENCOUNTER — HOSPITAL ENCOUNTER (OUTPATIENT)
Facility: HOSPITAL | Age: 50
LOS: 1 days | Discharge: HOME | End: 2025-06-09
Attending: STUDENT IN AN ORGANIZED HEALTH CARE EDUCATION/TRAINING PROGRAM | Admitting: STUDENT IN AN ORGANIZED HEALTH CARE EDUCATION/TRAINING PROGRAM
Payer: MEDICARE

## 2025-06-09 ENCOUNTER — ANESTHESIA EVENT (OUTPATIENT)
Dept: SURGERY | Facility: HOSPITAL | Age: 50
End: 2025-06-09
Payer: MEDICARE

## 2025-06-09 ENCOUNTER — ANESTHESIA (OUTPATIENT)
Dept: SURGERY | Facility: HOSPITAL | Age: 50
End: 2025-06-09
Payer: MEDICARE

## 2025-06-09 VITALS
HEART RATE: 80 BPM | BODY MASS INDEX: 22.76 KG/M2 | DIASTOLIC BLOOD PRESSURE: 85 MMHG | RESPIRATION RATE: 16 BRPM | WEIGHT: 145 LBS | SYSTOLIC BLOOD PRESSURE: 159 MMHG | HEIGHT: 67 IN | TEMPERATURE: 98 F | OXYGEN SATURATION: 100 %

## 2025-06-09 DIAGNOSIS — N18.6 ESRD (END STAGE RENAL DISEASE): Primary | ICD-10-CM

## 2025-06-09 DIAGNOSIS — I77.0 ARTERIOVENOUS FISTULA: Primary | ICD-10-CM

## 2025-06-09 DIAGNOSIS — N18.9 CHRONIC KIDNEY DISEASE: ICD-10-CM

## 2025-06-09 LAB — POCT GLUCOSE: 210 MG/DL (ref 70–110)

## 2025-06-09 PROCEDURE — 71000045 HC DOSC ROUTINE RECOVERY EA ADD'L HR: Performed by: STUDENT IN AN ORGANIZED HEALTH CARE EDUCATION/TRAINING PROGRAM

## 2025-06-09 PROCEDURE — 25000003 PHARM REV CODE 250

## 2025-06-09 PROCEDURE — 63600175 PHARM REV CODE 636 W HCPCS

## 2025-06-09 PROCEDURE — 71000044 HC DOSC ROUTINE RECOVERY FIRST HOUR: Performed by: STUDENT IN AN ORGANIZED HEALTH CARE EDUCATION/TRAINING PROGRAM

## 2025-06-09 PROCEDURE — 36000706: Performed by: STUDENT IN AN ORGANIZED HEALTH CARE EDUCATION/TRAINING PROGRAM

## 2025-06-09 PROCEDURE — 37000008 HC ANESTHESIA 1ST 15 MINUTES: Performed by: STUDENT IN AN ORGANIZED HEALTH CARE EDUCATION/TRAINING PROGRAM

## 2025-06-09 PROCEDURE — 71000015 HC POSTOP RECOV 1ST HR: Performed by: STUDENT IN AN ORGANIZED HEALTH CARE EDUCATION/TRAINING PROGRAM

## 2025-06-09 PROCEDURE — 37000009 HC ANESTHESIA EA ADD 15 MINS: Performed by: STUDENT IN AN ORGANIZED HEALTH CARE EDUCATION/TRAINING PROGRAM

## 2025-06-09 PROCEDURE — 71000016 HC POSTOP RECOV ADDL HR: Performed by: STUDENT IN AN ORGANIZED HEALTH CARE EDUCATION/TRAINING PROGRAM

## 2025-06-09 PROCEDURE — 82962 GLUCOSE BLOOD TEST: CPT | Performed by: STUDENT IN AN ORGANIZED HEALTH CARE EDUCATION/TRAINING PROGRAM

## 2025-06-09 PROCEDURE — 36000707: Performed by: STUDENT IN AN ORGANIZED HEALTH CARE EDUCATION/TRAINING PROGRAM

## 2025-06-09 PROCEDURE — 63600175 PHARM REV CODE 636 W HCPCS: Performed by: STUDENT IN AN ORGANIZED HEALTH CARE EDUCATION/TRAINING PROGRAM

## 2025-06-09 RX ORDER — HYDROMORPHONE HYDROCHLORIDE 1 MG/ML
0.2 INJECTION, SOLUTION INTRAMUSCULAR; INTRAVENOUS; SUBCUTANEOUS EVERY 5 MIN PRN
Status: DISCONTINUED | OUTPATIENT
Start: 2025-06-09 | End: 2025-06-09 | Stop reason: HOSPADM

## 2025-06-09 RX ORDER — MIDAZOLAM HYDROCHLORIDE 1 MG/ML
INJECTION INTRAMUSCULAR; INTRAVENOUS
Status: DISCONTINUED | OUTPATIENT
Start: 2025-06-09 | End: 2025-06-09

## 2025-06-09 RX ORDER — HYDROMORPHONE HYDROCHLORIDE 1 MG/ML
INJECTION, SOLUTION INTRAMUSCULAR; INTRAVENOUS; SUBCUTANEOUS
Status: DISCONTINUED
Start: 2025-06-09 | End: 2025-06-09 | Stop reason: HOSPADM

## 2025-06-09 RX ORDER — OXYCODONE AND ACETAMINOPHEN 5; 325 MG/1; MG/1
1 TABLET ORAL EVERY 6 HOURS PRN
Qty: 16 TABLET | Refills: 0 | Status: SHIPPED | OUTPATIENT
Start: 2025-06-09 | End: 2025-06-14

## 2025-06-09 RX ORDER — OXYCODONE AND ACETAMINOPHEN 5; 325 MG/1; MG/1
1 TABLET ORAL EVERY 6 HOURS PRN
Qty: 20 TABLET | Refills: 0 | Status: SHIPPED | OUTPATIENT
Start: 2025-06-09 | End: 2025-06-09

## 2025-06-09 RX ORDER — PHENYLEPHRINE HCL IN 0.9% NACL 1 MG/10 ML
SYRINGE (ML) INTRAVENOUS
Status: DISCONTINUED | OUTPATIENT
Start: 2025-06-09 | End: 2025-06-09

## 2025-06-09 RX ORDER — SODIUM CHLORIDE 9 MG/ML
INJECTION, SOLUTION INTRAVENOUS CONTINUOUS
Status: DISCONTINUED | OUTPATIENT
Start: 2025-06-09 | End: 2025-06-09 | Stop reason: HOSPADM

## 2025-06-09 RX ORDER — FENTANYL CITRATE 50 UG/ML
25 INJECTION, SOLUTION INTRAMUSCULAR; INTRAVENOUS EVERY 5 MIN PRN
Status: COMPLETED | OUTPATIENT
Start: 2025-06-09 | End: 2025-06-09

## 2025-06-09 RX ORDER — FENTANYL CITRATE 50 UG/ML
INJECTION, SOLUTION INTRAMUSCULAR; INTRAVENOUS
Status: DISCONTINUED | OUTPATIENT
Start: 2025-06-09 | End: 2025-06-09

## 2025-06-09 RX ORDER — MUPIROCIN 20 MG/G
OINTMENT TOPICAL
Status: DISCONTINUED | OUTPATIENT
Start: 2025-06-09 | End: 2025-06-09 | Stop reason: HOSPADM

## 2025-06-09 RX ORDER — OXYCODONE AND ACETAMINOPHEN 5; 325 MG/1; MG/1
1 TABLET ORAL ONCE AS NEEDED
Refills: 0 | Status: COMPLETED | OUTPATIENT
Start: 2025-06-09 | End: 2025-06-09

## 2025-06-09 RX ORDER — HALOPERIDOL LACTATE 5 MG/ML
0.5 INJECTION, SOLUTION INTRAMUSCULAR EVERY 10 MIN PRN
Status: DISCONTINUED | OUTPATIENT
Start: 2025-06-09 | End: 2025-06-09 | Stop reason: HOSPADM

## 2025-06-09 RX ORDER — GLUCAGON 1 MG
1 KIT INJECTION
Status: DISCONTINUED | OUTPATIENT
Start: 2025-06-09 | End: 2025-06-09 | Stop reason: HOSPADM

## 2025-06-09 RX ORDER — LIDOCAINE HYDROCHLORIDE 10 MG/ML
1 INJECTION, SOLUTION EPIDURAL; INFILTRATION; INTRACAUDAL; PERINEURAL ONCE
Status: COMPLETED | OUTPATIENT
Start: 2025-06-09 | End: 2025-06-09

## 2025-06-09 RX ORDER — CEFAZOLIN 2 G/1
2 INJECTION, POWDER, FOR SOLUTION INTRAMUSCULAR; INTRAVENOUS
Status: COMPLETED | OUTPATIENT
Start: 2025-06-09 | End: 2025-06-09

## 2025-06-09 RX ORDER — SODIUM CHLORIDE 0.9 % (FLUSH) 0.9 %
10 SYRINGE (ML) INJECTION
Status: DISCONTINUED | OUTPATIENT
Start: 2025-06-09 | End: 2025-06-09 | Stop reason: HOSPADM

## 2025-06-09 RX ORDER — LIDOCAINE HYDROCHLORIDE 20 MG/ML
INJECTION, SOLUTION EPIDURAL; INFILTRATION; INTRACAUDAL; PERINEURAL
Status: DISCONTINUED | OUTPATIENT
Start: 2025-06-09 | End: 2025-06-09

## 2025-06-09 RX ORDER — ONDANSETRON 8 MG/1
8 TABLET, ORALLY DISINTEGRATING ORAL EVERY 8 HOURS PRN
Status: DISCONTINUED | OUTPATIENT
Start: 2025-06-09 | End: 2025-06-09 | Stop reason: HOSPADM

## 2025-06-09 RX ORDER — PROPOFOL 10 MG/ML
VIAL (ML) INTRAVENOUS
Status: DISCONTINUED | OUTPATIENT
Start: 2025-06-09 | End: 2025-06-09

## 2025-06-09 RX ADMIN — SODIUM CHLORIDE: 9 INJECTION, SOLUTION INTRAVENOUS at 07:06

## 2025-06-09 RX ADMIN — PROPOFOL 100 MG: 10 INJECTION, EMULSION INTRAVENOUS at 07:06

## 2025-06-09 RX ADMIN — OXYCODONE HYDROCHLORIDE AND ACETAMINOPHEN 1 TABLET: 5; 325 TABLET ORAL at 11:06

## 2025-06-09 RX ADMIN — MEPIVACAINE HYDROCHLORIDE 30 ML: 15 INJECTION, SOLUTION EPIDURAL; INFILTRATION at 07:06

## 2025-06-09 RX ADMIN — FENTANYL CITRATE 25 MCG: 50 INJECTION INTRAMUSCULAR; INTRAVENOUS at 09:06

## 2025-06-09 RX ADMIN — LIDOCAINE HYDROCHLORIDE 10 MG: 10 INJECTION, SOLUTION EPIDURAL; INFILTRATION; INTRACAUDAL; PERINEURAL at 07:06

## 2025-06-09 RX ADMIN — PROPOFOL 150 MCG/KG/MIN: 10 INJECTION, EMULSION INTRAVENOUS at 07:06

## 2025-06-09 RX ADMIN — LIDOCAINE HYDROCHLORIDE 100 MG: 20 INJECTION, SOLUTION EPIDURAL; INFILTRATION; INTRACAUDAL at 07:06

## 2025-06-09 RX ADMIN — MUPIROCIN: 20 OINTMENT TOPICAL at 07:06

## 2025-06-09 RX ADMIN — Medication 100 MCG: at 08:06

## 2025-06-09 RX ADMIN — Medication 100 MCG: at 07:06

## 2025-06-09 RX ADMIN — SODIUM CHLORIDE: 0.9 INJECTION, SOLUTION INTRAVENOUS at 07:06

## 2025-06-09 RX ADMIN — HYDROMORPHONE HYDROCHLORIDE 0.2 MG: 1 INJECTION, SOLUTION INTRAMUSCULAR; INTRAVENOUS; SUBCUTANEOUS at 11:06

## 2025-06-09 RX ADMIN — MIDAZOLAM 2 MG: 1 INJECTION INTRAMUSCULAR; INTRAVENOUS at 07:06

## 2025-06-09 RX ADMIN — CEFAZOLIN 2 G: 2 INJECTION, POWDER, FOR SOLUTION INTRAMUSCULAR; INTRAVENOUS at 07:06

## 2025-06-09 RX ADMIN — FENTANYL CITRATE 25 MCG: 50 INJECTION INTRAMUSCULAR; INTRAVENOUS at 07:06

## 2025-06-09 NOTE — INTERVAL H&P NOTE
The patient has been examined and the H&P has been reviewed:    I concur with the findings and no changes have occurred since H&P was written.    Anesthesia/Surgery risks, benefits and alternative options discussed and understood by patient/family.    Yenny Rodriguez MD

## 2025-06-09 NOTE — ANESTHESIA PROCEDURE NOTES
Left supraclav SS + left ICB SS    Patient location during procedure: OR    Reason for block: primary anesthetic    Diagnosis: Left UE AVF   Start time: 6/9/2025 7:11 AM  Timeout: 6/9/2025 7:10 AM   End time: 6/9/2025 7:18 AM    Staffing  Authorizing Provider: Keegan Gomez MD  Performing Provider: Yuri Tellez DO    Staffing  Performed by: Yuri Tellez DO  Authorized by: Keegan Gomez MD    Preanesthetic Checklist  Completed: patient identified, IV checked, site marked, risks and benefits discussed, surgical consent, monitors and equipment checked, pre-op evaluation and timeout performed  Peripheral Block  Patient position: supine  Prep: ChloraPrep  Patient monitoring: heart rate, cardiac monitor, continuous pulse ox, continuous capnometry and frequent blood pressure checks  Block type: supraclavicular  Laterality: left  Injection technique: single shot  Needle  Needle type: Stimuplex   Needle gauge: 20 G  Needle length: 4 in  Needle localization: anatomical landmarks and ultrasound guidance   -ultrasound image captured on disc.  Assessment  Injection assessment: negative aspiration, negative parasthesia and local visualized surrounding nerve  Paresthesia pain: none  Heart rate change: no  Slow fractionated injection: yes  Pain Tolerance: comfortable throughout block  Medications:    Medications: mepivacaine (CARBOCAINE) injection 15 mg/mL (1.5%) - Perineural, Left Supraclavicular   30 mL - 6/9/2025 7:16:00 AM    Additional Notes  VSS.  [facility] RN monitoring vitals throughout procedure.  Patient tolerated procedure well.

## 2025-06-09 NOTE — BRIEF OP NOTE
Emre Stafford - Surgery ([facility])  Brief Operative Note    Surgery Date: 6/9/2025     Surgeons and Role:     * Neena Watts MD - Primary     * Yenny Rodriguez MD - Resident - Assisting    Pre-op Diagnosis:  ESRD (end stage renal disease) on dialysis [N18.6, Z99.2]  ESRD (end stage renal disease) [N18.6]    Post-op Diagnosis:  Post-Op Diagnosis Codes:     * ESRD (end stage renal disease) on dialysis [N18.6, Z99.2]     * ESRD (end stage renal disease) [N18.6]    Procedure(s) (LRB):  TRANSPOSITION, VEIN, BASILIC/ Second-stage Brachiobasilic (Left)    Anesthesia: Regional    Operative Findings: Please see operative note for full details. 2nd stage basilic vein transposition of L brachiobasilic fistula. Good palpable thrill at end of case.     Estimated Blood Loss: 5 cc    Specimens:   Specimen (24h ago, onward)      None            * No specimens in log *    Discharge Note    OUTCOME: Patient tolerated treatment/procedure well without complication and is now ready for discharge.    DISPOSITION: Home or Self Care    FINAL DIAGNOSIS:  <principal problem not specified>    FOLLOWUP: In clinic    DISCHARGE INSTRUCTIONS:    No heavy lifting more than 10 lbs for the first 6 weeks.  Discharge Procedure Orders   Diet general          Other restrictions (specify):   Order Comments: No heavy lifting over 10lbs for 6 weeks          Wound care routine (specify)   Order Comments: WOUND CARE  Keep island dressing on for first 48 hours  -- Ok to shower; however, no baths or submerging in water (I.e. swimming, submerging in water) for at least two weeks.  -- Please keep the incision clean with soap and water, pat your incision dry, do not scrub hard over your incisions.     MEDICATIONS AND PAIN CONTROL  -- Please resume all home medications as instructed and take any newly prescribed medications.  -- Most people find that over-the-counter pain medications (Tylenol combined with Ibuprofen) will be sufficient for most pain control.  --  If you're taking prescription narcotics, do not drive or operate heavy machinery. Do not drive if your pain is not controlled enough for you to react quickly safely.  -- Take a stool softener with narcotics medications to prevent constipation.     OTHER INSTRUCTIONS  -- Monitor for temp > 101 F, bleeding, redness, purulent drainage, or any sudden, new extreme pain. If any occur, please call our clinic or go to the emergency department if after normal business hours.  -- You may resume your regular diet as tolerated.   -- Follow up with Dr. Watts in 4 weeks in clinic for a post-op check. If no appointment is made within the week, please call the clinic to schedule.      Call MD for:  temperature >100.4      Call MD for:  persistent nausea and vomiting      Call MD for:  severe uncontrolled pain      Call MD for:  difficulty breathing, headache or visual disturbances      Call MD for:  redness, tenderness, or signs of infection (pain, swelling, redness, odor or green/yellow discharge around incision site)      Call MD for:  hives      Call MD for:  persistent dizziness or light-headedness      Call MD for:  extreme fatigue

## 2025-06-09 NOTE — DISCHARGE INSTRUCTIONS
Discharge Procedure Orders   Diet general          Other restrictions (specify):   Order Comments: No heavy lifting over 10lbs for 6 weeks          Wound care routine (specify)   Order Comments: WOUND CARE  -- Ok to shower; however, no baths or submerging in water (I.e. swimming, submerging in water) for at least two weeks.  -- Please keep the incision clean with soap and water, pat your incision dry, do not scrub hard over your incisions.     MEDICATIONS AND PAIN CONTROL  -- Please resume all home medications as instructed and take any newly prescribed medications.  -- Most people find that over-the-counter pain medications (Tylenol combined with Ibuprofen) will be sufficient for most pain control.  -- If you're taking prescription narcotics, do not drive or operate heavy machinery. Do not drive if your pain is not controlled enough for you to react quickly safely.  -- Take a stool softener with narcotics medications to prevent constipation.     OTHER INSTRUCTIONS  -- Monitor for temp > 101 F, bleeding, redness, purulent drainage, or any sudden, new extreme pain. If any occur, please call our clinic or go to the emergency department if after normal business hours.  -- You may resume your regular diet as tolerated.   -- Follow up with Dr. Watts in 1 weeks in clinic for a post-op check. If no appointment is made within the week, please call the clinic to schedule.      Call MD for:  temperature >100.4      Call MD for:  persistent nausea and vomiting      Call MD for:  severe uncontrolled pain      Call MD for:  difficulty breathing, headache or visual disturbances      Call MD for:  redness, tenderness, or signs of infection (pain, swelling, redness, odor or green/yellow discharge around incision site)      Call MD for:  hives      Call MD for:  persistent dizziness or light-headedness      Call MD for:  extreme fatigue

## 2025-06-09 NOTE — OP NOTE
Emre Stafford - Surgery ([facility])  Vascular Surgery  Operative Note    SUMMARY     Date of Procedure: 6/9/2025     Procedure: Procedure(s) (LRB):  TRANSPOSITION, VEIN, BASILIC/ Second-stage Brachiobasilic (Left)     Surgeons and Role:     * Neena Watts MD - Primary     * Yenny Rodriguez MD - Resident - Assisting        Pre-Operative Diagnosis: ESRD (end stage renal disease) on dialysis [N18.6, Z99.2]  ESRD (end stage renal disease) [N18.6]    Post-Operative Diagnosis: Post-Op Diagnosis Codes:     * ESRD (end stage renal disease) on dialysis [N18.6, Z99.2]     * ESRD (end stage renal disease) [N18.6]    Anesthesia: Regional    Procedures Performed:  1) Ultrasound examination of a previously created left upper extremity brachiobasilic AV fistula  2) superficialization and transposition of left arm brachiobasilic AV fistula    Operative Findings (including complications, if any): Matured brachiobasilic AV fistula without any obvious flow limiting stenoses on ultrasound. Large branch to the deep system mid-fistula. Successful translocation in a more superficial plane. Good thrill in the fistula and palpable left radial pulse at the completion of the case.     Description of Technical Procedures: Ms. Feldman is a 48 yo female with ESRD on HD via a tunneled catheter. She underwent previous creation of a left arm brachiobasilic AV fistula that had matured. Dialysis ultrasound performed after her fistulagram showed flows >1L and diameter of the fistula >6mm throughout. A superficialization procedure was recommended. Risks and benefits of the surgery were discussed including but not limited to death, pain, bleeding, scarring, infection, wound healing complications, loss of the fistula and need for further surgery. Informed consent was signed.    The patient was identified in the preoperative holding area and marked. She was brought to the operative suite where she underwent a block by the regional anesthesia team. MAC  anesthesia was also administered. She was then prepped and draped in the typical sterile fashion. A timeout was called confirming this was the correct patient and side. Ultrasound was then used to map out the course of the fistula and any large branches. We then made our incision along the course of the fistula. Dissection was carried down with a combination of Bovie electrocautery and sharp dissection until the fistula was identified. We then obtained vessel loop control and carried our dissection proximally. We were careful to tie off any branches along the way, which were doubly ligated with clips and silk ties. There was a particularly large branch to her deep system, so this was clamped and divided. The fistula side was oversewn with a 6-0 prolene and the other side was suture ligated and clipped. I left a larger nubbin on the fistula side as to not narrow it. The median cutaneous nerve had very few branches that needed to be ligated during our dissection. Once we had enough of the fistula dissected, we made our superficial skin flap. We then irrigated and obtained excellent hemostasis. After this, the subcutaneous flap was tacked to yogi's fascia layer with interrupted 3-0 vicryl sutures. I laid the fistula in the pocket and secured it with a few sutures to the surrounding tissue. We then closed the skin with a deep running 3-0 vicryl layer and 4-0 prolene at the skin in a running fashion. Dermabond was applied on the skin. She still had an excellent thrill in the fistula and palpable radial pulse. She tolerated the procedure well and without issue. She was delivered to PACU in stable condition.    Significant Surgical Tasks Conducted by the Assistant(s), if Applicable: N/A    Estimated Blood Loss (EBL): * No values recorded between 6/9/2025  7:06 AM and 6/9/2025  9:32 AM *           Implants: * No implants in log *    Specimens:   Specimen (24h ago, onward)      None                    Condition:  Good    Disposition: PACU - hemodynamically stable.    Attestation: I was present and scrubbed for the entire procedure.

## 2025-06-09 NOTE — TRANSFER OF CARE
"Anesthesia Transfer of Care Note    Patient: May Feldman    Procedure(s) Performed: Procedure(s) (LRB):  TRANSPOSITION, VEIN, BASILIC/ Second-stage Brachiobasilic (Left)    Patient location: [facility]    Anesthesia Type: general    Transport from OR: Transported from OR on 6-10 L/min O2 by face mask with adequate spontaneous ventilation    Post pain: adequate analgesia    Post assessment: tolerated procedure well and no apparent anesthetic complications    Post vital signs: stable    Level of consciousness: awake and alert    Nausea/Vomiting: no nausea/vomiting    Complications: none    Transfer of care protocol was followed      Last vitals: Visit Vitals  BP (!) 184/84 (BP Location: Right arm, Patient Position: Lying)   Pulse 83   Temp 36.3 °C (97.3 °F) (Skin)   Resp 20   Ht 5' 7" (1.702 m)   Wt 65.8 kg (145 lb)   SpO2 100%   Breastfeeding No   BMI 22.71 kg/m²     "

## 2025-06-09 NOTE — ANESTHESIA PREPROCEDURE EVALUATION
[facility]  Anesthesia Pre-Operative Evaluation         Patient Name: May Feldman  YOB: 1975  MRN: [id number]    SUBJECTIVE:     TRANSPOSITION, VEIN, BASILIC/ Second-stage Brachiobasilic (Left: Arm)      2025    May Feldman is a 49 y.o. female w/ a significant PMHx of ESRD on HD, T2DM, MI s/p PCI (), HTN, HLD, HFrEF, Secondary hyperparathyroidism, Vit D deficiency, and anemia of chronic disease .    Patient now presents for the above procedure(s).    No results found for this or any previous visit.       LDA: None documented.       Prev airway: None documented.    Drips: None documented.   0.9% NaCl   Intravenous Continuous           Problem List[1]    Review of patient's allergies indicates:   Allergen Reactions    Codeine Itching and Other (See Comments)       Current Inpatient Medications:   LIDOcaine (PF) 10 mg/ml (1%)  1 mL Intradermal Once       Medications Ordered Prior to Encounter[2]    Past Surgical History:   Procedure Laterality Date     SECTION      FISTULOGRAM, WITH PTA Left 2025    Procedure: FISTULOGRAM, WITH PTA// TRANSRADIAL;  Surgeon: Neena Watts MD;  Location: [facility] OR [address] [place] [address];  Service: Vascular;  Laterality: Left;  2.3 min  4.27 mGy  0.7875 Gy.cm  16ml Dye    INSERTION, GRAFT, ARTERIOVENOUS, UPPER EXTREMITY Left 2024    Procedure: INSERTION, GRAFT, ARTERIOVENOUS, UPPER EXTREMITY;  Surgeon: Ehsan Melchor MD;  [facility];  Service: Vascular;  Laterality: Left;       Social History[3]    OBJECTIVE:     Vital Signs Range (Last 24H):         Significant Labs:  Lab Results   Component Value Date    WBC 8.10 10/30/2024    HGB 12.7 10/30/2024    HCT 41.3 10/30/2024     10/30/2024    CHOL 127 2024    TRIG 118 2024    HDL 53 2024    ALT 9 (L) 2024    AST 12 2024     2025    K 4.8 2025     2025    CREATININE 5.5 (H) 2025    BUN 39 (H)  04/16/2025    CO2 29 04/16/2025    TSH 2.03 08/02/2023    INR 0.9 08/12/2024    HGBA1C 7.8 (H) 08/02/2023       Diagnostic Studies: No relevant studies.    EKG:   Results for orders placed or performed during the hospital encounter of 11/06/24   SCHEDULED EKG 12-LEAD (to Muse)    Collection Time: 11/06/24 12:19 PM   Result Value Ref Range    QRS Duration 124 ms    [facility] QTC Calculation 472 ms    Narrative    Test Reason : Z01.818,    Vent. Rate : 080 BPM     Atrial Rate : 080 BPM     P-R Int : 190 ms          QRS Dur : 124 ms      QT Int : 410 ms       P-R-T Axes : 061 085 021 degrees     QTc Int : 472 ms    Normal sinus rhythm  Right bundle branch block  Abnormal ECG  When compared with ECG of 04-APR-2014 00:53,  Right bundle branch block is now Present  Confirmed by Johnnie Sheikh MD ([id number]) on 11/6/2024 2:50:27 PM    Referred By: GRABIEL IRVING           Confirmed By:Johnnie Sheikh MD       2D ECHO:  TTE:  No results found for this or any previous visit.    KENNY:  No results found for this or any previous visit.    ASSESSMENT/PLAN:           Pre-op Assessment    I have reviewed the Patient Summary Reports.     I have reviewed the Nursing Notes. I have reviewed the NPO Status.   I have reviewed the Medications.     Review of Systems  Anesthesia Hx:  No problems with previous Anesthesia   History of prior surgery of interest to airway management or planning:          Denies Family Hx of Anesthesia complications.    Denies Personal Hx of Anesthesia complications.                    Hematology/Oncology:       -- Denies Anemia:                                  Cardiovascular:     Hypertension    Denies CAD.        Denies CHF.                             Hypertension         Pulmonary:    Denies COPD.  Denies Asthma.                    Renal/:  Chronic Renal Disease        Kidney Function/Disease             Hepatic/GI:      Denies GERD.                Neurological:    Denies CVA.    Denies Seizures.                                 Endocrine:  Diabetes    Diabetes                          Physical Exam  General: Well nourished, Cooperative, Alert and Oriented    Airway:  Mallampati: II   Mouth Opening: Normal  TM Distance: Normal  Tongue: Normal  Neck ROM: Normal ROM    Chest/Lungs:  Clear to auscultation, Normal Respiratory Rate    Heart:  Rate: Normal  Rhythm: Regular Rhythm        Anesthesia Plan  Type of Anesthesia, risks & benefits discussed:    Anesthesia Type: MAC, Gen Natural Airway, Regional, Gen Supraglottic Airway, Gen ETT  Intra-op Monitoring Plan: Standard ASA Monitors  Post Op Pain Control Plan: multimodal analgesia and IV/PO Opioids PRN  Induction:  IV  Informed Consent: Informed consent signed with the Patient and all parties understand the risks and agree with anesthesia plan.  All questions answered.   ASA Score: 3  Day of Surgery Review of History & Physical: H&P Update referred to the surgeon/provider.    Ready For Surgery From Anesthesia Perspective.     .           [1]   Patient Active Problem List  Diagnosis    ESRD (end stage renal disease)    S/P arteriovenous (AV) fistula creation    Arteriovenous fistula    ESRD (end stage renal disease) on dialysis   [2]   No current facility-administered medications on file prior to encounter.     Current Outpatient Medications on File Prior to Encounter   Medication Sig Dispense Refill    ALPRAZolam (XANAX) 2 MG Tab Take 2 mg by mouth 2 (two) times daily.      amLODIPine (NORVASC) 10 MG tablet Take 10 mg by mouth.      aspirin (ECOTRIN) 81 MG EC tablet Take 81 mg by mouth.      atorvastatin (LIPITOR) 40 MG tablet Take 40 mg by mouth.      calcitRIOL (ROCALTROL) 0.5 MCG Cap Take by mouth.      carisoprodoL (SOMA) 350 MG tablet Take 350 mg by mouth.      cetirizine (ZYRTEC) 10 MG tablet Take 10 mg by mouth.      cloNIDine (CATAPRES) 0.3 MG tablet Take 0.3 mg by mouth every evening.      clopidogreL (PLAVIX) 75 mg tablet Take 75 mg by mouth.      cyproheptadine (,PERIACTIN,)  2 mg/5 mL syrup Take by mouth.      ELIQUIS 2.5 mg Tab Take 2.5 mg by mouth 2 (two) times daily.      FLUoxetine 10 MG capsule Take 10 mg by mouth.      furosemide (LASIX) 20 MG tablet Take 20 mg by mouth daily as needed.      HUMALOG MIX 75-25 KWIKPEN 100 unit/mL (75-25) InPn Inject into the skin. Pt reports taking a sliding scale      hydrALAZINE (APRESOLINE) 25 MG tablet Take 25 mg by mouth 3 (three) times daily.      insulin lispro (HUMALOG) 100 unit/mL injection Inject into the skin 3 (three) times daily before meals.      lactulose (CHRONULAC) 10 gram/15 mL solution SMARTSI teaspoon By Mouth Twice Daily      LANTUS SOLOSTAR U-100 INSULIN 100 unit/mL (3 mL) InPn pen Inject 10 Units into the skin every evening.      lisinopril 10 MG tablet Take 10 mg by mouth once daily.      losartan (COZAAR) 100 MG tablet Take 100 mg by mouth.      metoprolol tartrate (LOPRESSOR) 100 MG tablet Take 100 mg by mouth 2 (two) times daily.      pantoprazole (PROTONIX) 40 MG tablet Take 40 mg by mouth.      sevelamer carbonate (RENVELA) 800 mg Tab Take 800 mg by mouth 3 (three) times daily.      sodium bicarbonate 650 MG tablet Take 650 mg by mouth 3 (three) times daily.      sodium zirconium cyclosilicate (LOKELMA) 10 gram packet Take 10 g by mouth.      blood sugar diagnostic Strp USE ONE test strip UP TO THREE TIMES DAILY      buprenorphine-naloxone 8-2 mg (SUBOXONE) 8-2 mg Place under the tongue 3 (three) times daily.     [3]   Social History  Socioeconomic History    Marital status: Single   Tobacco Use    Smoking status: Every Day     Current packs/day: 1.00     Average packs/day: 1 pack/day for 25.0 years (25.0 ttl pk-yrs)     Types: Cigarettes   Substance and Sexual Activity    Alcohol use: No    Drug use: No     Social Drivers of Health     Financial Resource Strain: Patient Declined (2024)    Received from [facility]    Overall Financial Resource Strain (CARDIA)     Difficulty of Paying Living Expenses: Patient  declined   Food Insecurity: Patient Declined (4/2/2024)    Received from [facility]    Hunger Vital Sign     Worried About Running Out of Food in the Last Year: Patient declined     Ran Out of Food in the Last Year: Patient declined   Transportation Needs: Patient Declined (4/2/2024)    Received from [facility]    PRAPARE - Transportation     Lack of Transportation (Medical): Patient declined     Lack of Transportation (Non-Medical): Patient declined   Physical Activity: Patient Declined (4/2/2024)    Received from [facility]    Exercise Vital Sign     Days of Exercise per Week: Patient declined     Minutes of Exercise per Session: Patient declined   Stress: Patient Declined (4/2/2024)    Received from [facility]    [employer] - Occupational Stress Questionnaire     Feeling of Stress : Patient declined

## 2025-06-12 NOTE — ANESTHESIA POSTPROCEDURE EVALUATION
Anesthesia Post Evaluation    Patient: May Feldman    Procedure(s) Performed: Procedure(s) (LRB):  TRANSPOSITION, VEIN, BASILIC/ Second-stage Brachiobasilic (Left)    Final Anesthesia Type: general      Patient location during evaluation: PACU  Patient participation: Yes- Able to Participate  Level of consciousness: awake  Post-procedure vital signs: reviewed and stable  Pain management: adequate  Airway patency: patent    PONV status at discharge: No PONV  Anesthetic complications: no      Cardiovascular status: blood pressure returned to baseline  Respiratory status: unassisted  Hydration status: euvolemic  Follow-up not needed.              Vitals Value Taken Time   /85 06/09/25 12:00   Temp 36.6 °C (97.8 °F) 06/09/25 11:45   Pulse 80 06/09/25 12:00   Resp 16 06/09/25 12:00   SpO2 100 % 06/09/25 12:00         No case tracking events are documented in the log.      Pain/Kaylen Score: No data recorded

## 2025-07-11 ENCOUNTER — HOSPITAL ENCOUNTER (OUTPATIENT)
Dept: VASCULAR SURGERY | Facility: CLINIC | Age: 50
Discharge: HOME | End: 2025-07-11
Attending: STUDENT IN AN ORGANIZED HEALTH CARE EDUCATION/TRAINING PROGRAM
Payer: MEDICARE

## 2025-07-11 ENCOUNTER — OFFICE VISIT (OUTPATIENT)
Dept: VASCULAR SURGERY | Facility: CLINIC | Age: 50
End: 2025-07-11
Attending: STUDENT IN AN ORGANIZED HEALTH CARE EDUCATION/TRAINING PROGRAM
Payer: MEDICARE

## 2025-07-11 ENCOUNTER — TELEPHONE (OUTPATIENT)
Dept: VASCULAR SURGERY | Facility: CLINIC | Age: 50
End: 2025-07-11

## 2025-07-11 VITALS
HEART RATE: 66 BPM | BODY MASS INDEX: 23.38 KG/M2 | DIASTOLIC BLOOD PRESSURE: 75 MMHG | TEMPERATURE: 99 F | WEIGHT: 145.5 LBS | SYSTOLIC BLOOD PRESSURE: 166 MMHG | HEIGHT: 66 IN

## 2025-07-11 DIAGNOSIS — N18.6 ESRD (END STAGE RENAL DISEASE): ICD-10-CM

## 2025-07-11 DIAGNOSIS — N18.6 ESRD (END STAGE RENAL DISEASE) ON DIALYSIS: ICD-10-CM

## 2025-07-11 DIAGNOSIS — T82.858D ARTERIOVENOUS FISTULA STENOSIS, SUBSEQUENT ENCOUNTER: Primary | ICD-10-CM

## 2025-07-11 DIAGNOSIS — Z99.2 ESRD (END STAGE RENAL DISEASE) ON DIALYSIS: ICD-10-CM

## 2025-07-11 DIAGNOSIS — N18.6 ESRD (END STAGE RENAL DISEASE): Primary | ICD-10-CM

## 2025-07-11 DIAGNOSIS — Z01.818 PRE-OP EVALUATION: ICD-10-CM

## 2025-07-11 PROCEDURE — 93990 DOPPLER FLOW TESTING: CPT | Mod: PBBFAC | Performed by: STUDENT IN AN ORGANIZED HEALTH CARE EDUCATION/TRAINING PROGRAM

## 2025-07-11 PROCEDURE — 99999 PR PBB SHADOW E&M-EST. PATIENT-LVL IV: CPT | Mod: PBBFAC,,, | Performed by: STUDENT IN AN ORGANIZED HEALTH CARE EDUCATION/TRAINING PROGRAM

## 2025-07-11 PROCEDURE — 99214 OFFICE O/P EST MOD 30 MIN: CPT | Mod: PBBFAC | Performed by: STUDENT IN AN ORGANIZED HEALTH CARE EDUCATION/TRAINING PROGRAM

## 2025-07-11 PROCEDURE — 93990 DOPPLER FLOW TESTING: CPT | Mod: 26,S$PBB,, | Performed by: STUDENT IN AN ORGANIZED HEALTH CARE EDUCATION/TRAINING PROGRAM

## 2025-07-11 NOTE — PROGRESS NOTES
May Feldman  07/11/2025    HPI:  Patient is a 49 y.o. female with a h/o stage V CKD on HD since early 2024, active tobacco use, HLD, HTN, CAD/MI s/p PCIs x2, TIA, and DM II who is here today for f/u. HD T/R/S via R tunneled cath. Dialysis for almost 1 year. Right hand dominant.     +MI April 2024  Tobacco use: yes, 1/2 pack per day; 35 year smoking hx    12/19/24  L 1st stage brachial artery to basilic vein AVF     INTERVAL UPDATE    She returns to clinic today status post second-stage brachiobasilic superficialization on 06/09/2025.  She is doing well.  She has healed well from her surgery.  She denies any drainage, erythema or other issues.  She does think that her hand is a little bit more swollen.    No, Primary Doctor   Employment: Disabled      Current Outpatient Medications:     ALPRAZolam (XANAX) 2 MG Tab, Take 2 mg by mouth 2 (two) times daily., Disp: , Rfl:     amLODIPine (NORVASC) 10 MG tablet, Take 10 mg by mouth., Disp: , Rfl:     aspirin (ECOTRIN) 81 MG EC tablet, Take 81 mg by mouth., Disp: , Rfl:     atorvastatin (LIPITOR) 40 MG tablet, Take 40 mg by mouth., Disp: , Rfl:     blood sugar diagnostic Strp, USE ONE test strip UP TO THREE TIMES DAILY, Disp: , Rfl:     buprenorphine-naloxone 8-2 mg (SUBOXONE) 8-2 mg, Place under the tongue 3 (three) times daily., Disp: , Rfl:     calcitRIOL (ROCALTROL) 0.5 MCG Cap, Take by mouth., Disp: , Rfl:     carisoprodoL (SOMA) 350 MG tablet, Take 350 mg by mouth., Disp: , Rfl:     cetirizine (ZYRTEC) 10 MG tablet, Take 10 mg by mouth., Disp: , Rfl:     cloNIDine (CATAPRES) 0.3 MG tablet, Take 0.3 mg by mouth every evening., Disp: , Rfl:     clopidogreL (PLAVIX) 75 mg tablet, Take 75 mg by mouth., Disp: , Rfl:     cyproheptadine (,PERIACTIN,) 2 mg/5 mL syrup, Take by mouth., Disp: , Rfl:     ELIQUIS 2.5 mg Tab, Take 2.5 mg by mouth 2 (two) times daily., Disp: , Rfl:     FLUoxetine 10 MG capsule, Take 10 mg by mouth., Disp: , Rfl:     furosemide (LASIX) 20 MG  tablet, Take 20 mg by mouth daily as needed., Disp: , Rfl:     HUMALOG MIX 75-25 KWIKPEN 100 unit/mL (75-25) InPn, Inject into the skin. Pt reports taking a sliding scale, Disp: , Rfl:     hydrALAZINE (APRESOLINE) 25 MG tablet, Take 25 mg by mouth 3 (three) times daily., Disp: , Rfl:     insulin lispro (HUMALOG) 100 unit/mL injection, Inject into the skin 3 (three) times daily before meals., Disp: , Rfl:     lactulose (CHRONULAC) 10 gram/15 mL solution, SMARTSI teaspoon By Mouth Twice Daily, Disp: , Rfl:     LANTUS SOLOSTAR U-100 INSULIN 100 unit/mL (3 mL) InPn pen, Inject 10 Units into the skin every evening., Disp: , Rfl:     lisinopril 10 MG tablet, Take 10 mg by mouth once daily., Disp: , Rfl:     losartan (COZAAR) 100 MG tablet, Take 100 mg by mouth., Disp: , Rfl:     metoprolol tartrate (LOPRESSOR) 100 MG tablet, Take 100 mg by mouth 2 (two) times daily., Disp: , Rfl:     pantoprazole (PROTONIX) 40 MG tablet, Take 40 mg by mouth., Disp: , Rfl:     sevelamer carbonate (RENVELA) 800 mg Tab, Take 800 mg by mouth 3 (three) times daily., Disp: , Rfl:     sodium bicarbonate 650 MG tablet, Take 650 mg by mouth 3 (three) times daily., Disp: , Rfl:     sodium zirconium cyclosilicate (LOKELMA) 10 gram packet, Take 10 g by mouth., Disp: , Rfl:     PHYSICAL EXAM:   Right Arm BP - Sittin/75 (25 1400)  Pulse: 66  Temp: 98.5 °F (36.9 °C)        Extremities:  Her incision is well healed.  She has no induration or fluctuance.  Her fistula is very easy to palpate.  It is somewhat pulsatile.  LAB RESULTS:  Lab Results   Component Value Date    K 4.8 2025    K 4.5 10/30/2024    K 4.0 2024    CREATININE 5.5 (H) 2025    CREATININE 5.3 (H) 10/30/2024    CREATININE 8.32 (H) 2024     Lab Results   Component Value Date    WBC 8.10 10/30/2024    WBC 0-5 2023    WBC 7.8 2023    HCT 41.3 10/30/2024    HCT 26.3 (L) 2023    HCT 28.9 (L) 2014     10/30/2024     (H)  04/04/2014     Lab Results   Component Value Date    HGBA1C 7.8 (H) 08/02/2023    HGBA1C 7.4 (H) 03/13/2023    HGBA1C 7.3 (H) 11/07/2022       IMAGING (I have independently reviewed and interpreted the following tests):  Dialysis access evaluation shows flows of 1800, with diameters of greater than 7 mm with depth less than 6 mm.  She does have a mid fistula stenosis with an increase in velocity at her distal fistula.    IMP/PLAN:     49 y.o. female with a h/o stage V CKD on HD since early 2024, active tobacco use, HLD, HTN, CAD/MI s/p PCIs x2, TIA, and DM II - now status post second-stage superficialization procedure of a left brachiobasilic AV fistula.    Her fistula is well healed and easily palpable.  It has flows of greater than 1800.  I have okayed this for use.  We will also set up for fistulogram next week just to address the outflow stenosis before it causes a bigger issue.

## 2025-07-11 NOTE — TELEPHONE ENCOUNTER
Contacted pt in response to message. States she must reschedule fistulagram with Dr. Watts on 7/16/25 to later date. Offered 7/23/25, pt accepted. Will have OR schedulers reschedule case upon return to office Monday 7/14/25.    Copied from CRM #[id number]. Topic: Appointments - Appointment Access  >> Jul 11, 2025  4:54 PM Melonie wrote:    Name Of Caller:   May    Contact Preference:  [phone]    Nature of Call: Pt would like to reschedule procedure date. She has to bring her blind daughter to an appt on the same day.

## 2025-07-15 RX ORDER — SODIUM CHLORIDE 0.9 % (FLUSH) 0.9 %
10 SYRINGE (ML) INJECTION
OUTPATIENT
Start: 2025-07-15

## 2025-07-15 RX ORDER — HYDROMORPHONE HYDROCHLORIDE 1 MG/ML
0.2 INJECTION, SOLUTION INTRAMUSCULAR; INTRAVENOUS; SUBCUTANEOUS EVERY 5 MIN PRN
Refills: 0 | OUTPATIENT
Start: 2025-07-15

## 2025-07-15 RX ORDER — HALOPERIDOL LACTATE 5 MG/ML
0.5 INJECTION, SOLUTION INTRAMUSCULAR EVERY 10 MIN PRN
OUTPATIENT
Start: 2025-07-15

## 2025-07-15 RX ORDER — ACETAMINOPHEN 500 MG
1000 TABLET ORAL
OUTPATIENT
Start: 2025-07-15 | End: 2025-07-15

## 2025-07-15 RX ORDER — GLUCAGON 1 MG
1 KIT INJECTION
OUTPATIENT
Start: 2025-07-15

## 2025-07-25 ENCOUNTER — HOSPITAL ENCOUNTER (OUTPATIENT)
Facility: HOSPITAL | Age: 50
Discharge: HOME | End: 2025-07-25
Attending: STUDENT IN AN ORGANIZED HEALTH CARE EDUCATION/TRAINING PROGRAM | Admitting: STUDENT IN AN ORGANIZED HEALTH CARE EDUCATION/TRAINING PROGRAM
Payer: MEDICARE

## 2025-07-25 VITALS
WEIGHT: 145 LBS | SYSTOLIC BLOOD PRESSURE: 169 MMHG | TEMPERATURE: 97 F | HEART RATE: 66 BPM | HEIGHT: 66 IN | DIASTOLIC BLOOD PRESSURE: 78 MMHG | RESPIRATION RATE: 16 BRPM | BODY MASS INDEX: 23.3 KG/M2 | OXYGEN SATURATION: 100 %

## 2025-07-25 DIAGNOSIS — T82.858A AV FISTULA STENOSIS: ICD-10-CM

## 2025-07-25 DIAGNOSIS — T82.858D ARTERIOVENOUS FISTULA STENOSIS, SUBSEQUENT ENCOUNTER: ICD-10-CM

## 2025-07-25 LAB
OHS CV CPX PATIENT HEIGHT IN: 66
POCT GLUCOSE: 143 MG/DL (ref 70–110)

## 2025-07-25 PROCEDURE — 25500020 PHARM REV CODE 255: Performed by: STUDENT IN AN ORGANIZED HEALTH CARE EDUCATION/TRAINING PROGRAM

## 2025-07-25 PROCEDURE — 99152 MOD SED SAME PHYS/QHP 5/>YRS: CPT | Mod: ,,, | Performed by: STUDENT IN AN ORGANIZED HEALTH CARE EDUCATION/TRAINING PROGRAM

## 2025-07-25 PROCEDURE — 27201423 OPTIME MED/SURG SUP & DEVICES STERILE SUPPLY: Performed by: STUDENT IN AN ORGANIZED HEALTH CARE EDUCATION/TRAINING PROGRAM

## 2025-07-25 PROCEDURE — 99152 MOD SED SAME PHYS/QHP 5/>YRS: CPT | Performed by: STUDENT IN AN ORGANIZED HEALTH CARE EDUCATION/TRAINING PROGRAM

## 2025-07-25 PROCEDURE — 36902 INTRO CATH DIALYSIS CIRCUIT: CPT | Performed by: STUDENT IN AN ORGANIZED HEALTH CARE EDUCATION/TRAINING PROGRAM

## 2025-07-25 PROCEDURE — 36902 INTRO CATH DIALYSIS CIRCUIT: CPT | Mod: 78,GC,, | Performed by: STUDENT IN AN ORGANIZED HEALTH CARE EDUCATION/TRAINING PROGRAM

## 2025-07-25 PROCEDURE — 63600175 PHARM REV CODE 636 W HCPCS: Performed by: STUDENT IN AN ORGANIZED HEALTH CARE EDUCATION/TRAINING PROGRAM

## 2025-07-25 PROCEDURE — C1725 CATH, TRANSLUMIN NON-LASER: HCPCS | Performed by: STUDENT IN AN ORGANIZED HEALTH CARE EDUCATION/TRAINING PROGRAM

## 2025-07-25 PROCEDURE — C1769 GUIDE WIRE: HCPCS | Performed by: STUDENT IN AN ORGANIZED HEALTH CARE EDUCATION/TRAINING PROGRAM

## 2025-07-25 PROCEDURE — C1894 INTRO/SHEATH, NON-LASER: HCPCS | Performed by: STUDENT IN AN ORGANIZED HEALTH CARE EDUCATION/TRAINING PROGRAM

## 2025-07-25 PROCEDURE — 99153 MOD SED SAME PHYS/QHP EA: CPT | Performed by: STUDENT IN AN ORGANIZED HEALTH CARE EDUCATION/TRAINING PROGRAM

## 2025-07-25 RX ORDER — HEPARIN SODIUM 1000 [USP'U]/ML
INJECTION, SOLUTION INTRAVENOUS; SUBCUTANEOUS
Status: DISCONTINUED | OUTPATIENT
Start: 2025-07-25 | End: 2025-07-25 | Stop reason: HOSPADM

## 2025-07-25 RX ORDER — FENTANYL CITRATE 50 UG/ML
INJECTION, SOLUTION INTRAMUSCULAR; INTRAVENOUS
Status: DISCONTINUED | OUTPATIENT
Start: 2025-07-25 | End: 2025-07-25 | Stop reason: HOSPADM

## 2025-07-25 RX ORDER — ONDANSETRON HYDROCHLORIDE 2 MG/ML
4 INJECTION, SOLUTION INTRAVENOUS EVERY 12 HOURS PRN
Status: DISCONTINUED | OUTPATIENT
Start: 2025-07-25 | End: 2025-07-25 | Stop reason: HOSPADM

## 2025-07-25 RX ORDER — SODIUM CHLORIDE 9 MG/ML
INJECTION, SOLUTION INTRAVENOUS CONTINUOUS
Status: DISCONTINUED | OUTPATIENT
Start: 2025-07-25 | End: 2025-07-25 | Stop reason: HOSPADM

## 2025-07-25 RX ORDER — MIDAZOLAM HYDROCHLORIDE 1 MG/ML
INJECTION, SOLUTION INTRAMUSCULAR; INTRAVENOUS
Status: DISCONTINUED | OUTPATIENT
Start: 2025-07-25 | End: 2025-07-25 | Stop reason: HOSPADM

## 2025-07-25 RX ORDER — IODIXANOL 320 MG/ML
INJECTION, SOLUTION INTRAVASCULAR
Status: DISCONTINUED | OUTPATIENT
Start: 2025-07-25 | End: 2025-07-25 | Stop reason: HOSPADM

## 2025-07-25 NOTE — BRIEF OP NOTE
[facility] - Cath Lab  Brief Operative Note    Surgery Date: 7/25/2025     Surgeons and Role:     * Neena Watts MD - Primary     * Frank Miranda MD - Fellow    Pre-op Diagnosis:  Arteriovenous fistula stenosis, subsequent encounter [T82.858D]    Post-op Diagnosis:  Post-Op Diagnosis Codes:     * Arteriovenous fistula stenosis, subsequent encounter [T82.858D]    Procedure:   US guided LUE Brachiobasilic Fistulagram   PTA of mid fistula stenosis >80% (7x40 Ultraverse)   Conscious Sedation    Anesthesia: RN IV Sedation    Operative Findings:   <20% residual stenosis with significantly less pulsatility    May cannulate immediately.     Estimated Blood Loss: 1 cc         Specimens: None        Discharge Note    OUTCOME: Patient tolerated treatment/procedure well without complication and is now ready for discharge.    DISPOSITION: Home or Self Care    FINAL DIAGNOSIS:  <principal problem not specified>    FOLLOWUP: In clinic    DISCHARGE INSTRUCTIONS:    Discharge Procedure Orders   Remove dressing in 48 hours     Notify your health care provider if you experience any of the following:  redness, tenderness, or signs of infection (pain, swelling, redness, odor or green/yellow discharge around incision site)     Notify your health care provider if you experience any of the following:  severe uncontrolled pain

## 2025-07-25 NOTE — Clinical Note
The balloon was inserted into the left AV fistula.  Balloon was inflated and removed following angioplasty.

## 2025-07-25 NOTE — PLAN OF CARE
Received report from Simone. Patient s/p fistulagram, AAOx3. VSS, no c/o pain or discomfort at this time, resp even and unlabored. Gauze/tegaderm dressing to L upper arm is CDI. No active bleeding. No hematoma noted. Post procedure protocol reviewed with patient. Understanding verbalized. Nurse call bell within reach.

## 2025-07-25 NOTE — PROGRESS NOTES
Pt DC'd per MD order. Discharge instructions given including activity, wound care, S&S of infections, future appointments, and when to call MD. Medications reviewed including when to take next dose. Patient in room awaiting for ride to arrive.

## 2025-07-25 NOTE — DISCHARGE INSTRUCTIONS
Fistulogram Discharge instructions:  1. Keep your dressing dry for 24 hours.  2. After 24 hours, you may remove the bandage and clean the area with soapy water.  3. The sutures at the site may be removed by the dialysis unit or return to the surgery clinic in 3-4 days to have them removed.  4. Avoid heavy lifting (over 10 lbs) and strenuous activity with the affected arm for 2 days.  5. Inspect the puncture site daily for any signs of redness, swelling or drainage. Return to ER for any signs of infection, numbess, loss of feeling or pale skin to arm or hand below the procedure site        Post Sedation Discharge Instructions     1. On the day of your procedure, remain in bed and only get up to go to the bathroom until the following day.  2. The day after your procedure, you may gradually resume your normal activities.   3. Do not drive, operate machinery, sign legal documents, cook or care for young children for 24 hours after your procedure.  4. Return to the ER for any shortness of breath, difficulty breathing, temperature of 101 or greater or any severe pain unrelieved by pain medicine.          Recovery After Procedural Sedation (Adult)  You have been given medicine by vein to make you sleep during your surgery. This may have included both a pain medicine and sleeping medicine. Most of the effects have worn off. But you may still have some drowsiness for the next 6 to 8 hours.  Home care  Follow these guidelines when you get home:  For the next 8 hours, you should be watched by a responsible adult. This person should make sure your condition is not getting worse.  Don't drink any alcohol for the next 24 hours.  Don't drive, operate dangerous machinery, or make important business or personal decisions during the next 24 hours.  Do not make any important decisions for 24 hours  Note: Your healthcare provider may tell you not to take any medicine by mouth for pain or sleep in the next 4 hours. These medicines may  react with the medicines you were given in the hospital. This could cause a much stronger response than usual.  Follow-up care  Follow up with your healthcare provider if you are not alert and back to your usual level of activity within 12 hours.  When to seek medical advice  Call your healthcare provider right away if any of these occur:  Drowsiness gets worse  Weakness or dizziness gets worse  Repeated vomiting  You can't be awakened

## 2025-07-25 NOTE — PLAN OF CARE
Patient ambulated on unit this morning by herself.  Patient states she will call the transport company for pickup.  Pt states the same transport company dropped her off today.  Denies pain or SOB.  Verbalized an understanding of procedure.  Oriented to unit and call bell provided.  Will continue to monitor.  CBG = 143 on admit.

## 2025-07-25 NOTE — PROGRESS NOTES
Patient states she is very cold and is going to wait downstairs for ride to get here. Patient encouraged to wait in room until ride is here, but patient refused and ambulated off unit. States that she has spoken to her ride and they are on the way.

## 2025-07-25 NOTE — OP NOTE
[facility] Cath Lab  Operative Note     Surgery Date: 7/25/2025      Surgeons and Role:     * Neena Watts MD - Primary     * Frank Miranda MD - Fellow     Pre-op Diagnosis:  Arteriovenous fistula stenosis, subsequent encounter [T82.858D]     Post-op Diagnosis:  Post-Op Diagnosis Codes:     * Arteriovenous fistula stenosis, subsequent encounter [T82.858D]     Procedure:   US guided LUE Brachiobasilic Fistulagram   PTA of mid fistula stenosis >80% (7x40 Ultraverse)   Conscious Sedation     Anesthesia: RN IV Sedation     Operative Findings:   <20% residual stenosis with significantly less pulsatility     May cannulate immediately.      Estimated Blood Loss: 1 cc         Specimens: None    Operative Detail: The patient was brought to the operating room and placed on the cath lab table in supine position.  The left upper extremity AV fistula was prepped and draped in the usual sterile fashion.  A surgical time-out was performed.  The access site was infiltrated with lidocaine and was accessed with a micropuncture set and fistulogram performed.  This demonstrated >80% mid fistula stenosis. Given these findings, the decision was made to intervene. The patient was given 3000 units of heparin.  An 0.35 stiff angled glidewire was used to cross the lesion into the subclavian vein. We then exchanged our microcatheter for 5F sheath and advanced a 7x40 Ultraverse and inflated to burst pressure (7.4 mm) across the lesion with severe waste. Follow-up fistulogram revealed significant improvement. All catheters and guidewires removed hemostasis was achieved with a Monocryl U-stitch. A sterile dressing was applied and the patient liberated from anesthesia and was taken recovery area in stable condition.     MODERATE SEDATION IN CATH LAB   Dr. Watts was present for moderate sedation, and monitored the patients cardio respiratory functions during the moderate sedation   See nurses notes for Intra-service start and end times  and for the log of the name of the RN who administered the medicines for the moderate sedation, including their doseage and route.     Time of sedation: 30 minutes      Dr. Watts was present and scrubbed for all aspects of the case.    All instrument and sponge counts were confirmed correct. The family was notified of the results of the surgery afterwards.

## 2025-08-07 ENCOUNTER — OFFICE VISIT (OUTPATIENT)
Dept: VASCULAR SURGERY | Facility: CLINIC | Age: 50
End: 2025-08-07
Attending: STUDENT IN AN ORGANIZED HEALTH CARE EDUCATION/TRAINING PROGRAM
Payer: MEDICARE

## 2025-08-07 ENCOUNTER — HOSPITAL ENCOUNTER (OUTPATIENT)
Dept: VASCULAR SURGERY | Facility: CLINIC | Age: 50
Discharge: HOME | End: 2025-08-07
Attending: STUDENT IN AN ORGANIZED HEALTH CARE EDUCATION/TRAINING PROGRAM
Payer: MEDICARE

## 2025-08-07 VITALS
TEMPERATURE: 98 F | SYSTOLIC BLOOD PRESSURE: 162 MMHG | HEIGHT: 66 IN | HEART RATE: 73 BPM | DIASTOLIC BLOOD PRESSURE: 72 MMHG | WEIGHT: 147.69 LBS | BODY MASS INDEX: 23.74 KG/M2

## 2025-08-07 DIAGNOSIS — N18.6 ESRD (END STAGE RENAL DISEASE) ON DIALYSIS: ICD-10-CM

## 2025-08-07 DIAGNOSIS — Z99.2 ESRD (END STAGE RENAL DISEASE) ON DIALYSIS: Primary | ICD-10-CM

## 2025-08-07 DIAGNOSIS — N18.6 ESRD (END STAGE RENAL DISEASE) ON DIALYSIS: Primary | ICD-10-CM

## 2025-08-07 DIAGNOSIS — Z99.2 ESRD (END STAGE RENAL DISEASE) ON DIALYSIS: ICD-10-CM

## 2025-08-07 PROCEDURE — 93990 DOPPLER FLOW TESTING: CPT | Mod: 26,S$PBB,, | Performed by: SURGERY

## 2025-08-07 PROCEDURE — 99999 PR PBB SHADOW E&M-EST. PATIENT-LVL IV: CPT | Mod: PBBFAC,,, | Performed by: STUDENT IN AN ORGANIZED HEALTH CARE EDUCATION/TRAINING PROGRAM

## 2025-08-07 PROCEDURE — 99214 OFFICE O/P EST MOD 30 MIN: CPT | Mod: PBBFAC,25 | Performed by: STUDENT IN AN ORGANIZED HEALTH CARE EDUCATION/TRAINING PROGRAM

## 2025-08-07 PROCEDURE — 93990 DOPPLER FLOW TESTING: CPT | Mod: PBBFAC | Performed by: SURGERY

## 2025-08-07 NOTE — PROGRESS NOTES
May Feldman  08/07/2025    HPI:  Patient is a 49 y.o. female with a h/o stage V CKD on HD since early 2024, active tobacco use, HLD, HTN, CAD/MI s/p PCIs x2, TIA, and DM II who is here today for f/u. HD T/R/S via R tunneled cath. Dialysis for almost 1 year. Right hand dominant.     +MI April 2024  Tobacco use: yes, 1/2 pack per day; 35 year smoking hx    12/19/24  L 1st stage brachial artery to basilic vein AVF     INTERVAL UPDATE    She returns to clinic today status post second-stage brachiobasilic superficialization on 06/09/2025 and now s/p fistulagram with PTA of a stenotic segment on 7/25/25 they 7 x 40 Ultraverse balloon.  She is doing great.  She tells me that she has been able to dialyze at least 4 times with just her fistula without incident.  She wants her catheter out.    No, Primary Doctor   Employment: Disabled      Current Outpatient Medications:     ALPRAZolam (XANAX) 2 MG Tab, Take 2 mg by mouth 2 (two) times daily., Disp: , Rfl:     amLODIPine (NORVASC) 10 MG tablet, Take 10 mg by mouth., Disp: , Rfl:     aspirin (ECOTRIN) 81 MG EC tablet, Take 81 mg by mouth., Disp: , Rfl:     atorvastatin (LIPITOR) 40 MG tablet, Take 40 mg by mouth., Disp: , Rfl:     blood sugar diagnostic Strp, USE ONE test strip UP TO THREE TIMES DAILY, Disp: , Rfl:     buprenorphine-naloxone 8-2 mg (SUBOXONE) 8-2 mg, Place under the tongue 3 (three) times daily., Disp: , Rfl:     calcitRIOL (ROCALTROL) 0.5 MCG Cap, Take by mouth., Disp: , Rfl:     carisoprodoL (SOMA) 350 MG tablet, Take 350 mg by mouth., Disp: , Rfl:     cetirizine (ZYRTEC) 10 MG tablet, Take 10 mg by mouth., Disp: , Rfl:     cloNIDine (CATAPRES) 0.3 MG tablet, Take 0.3 mg by mouth every evening., Disp: , Rfl:     clopidogreL (PLAVIX) 75 mg tablet, Take 75 mg by mouth., Disp: , Rfl:     cyproheptadine (,PERIACTIN,) 2 mg/5 mL syrup, Take by mouth., Disp: , Rfl:     ELIQUIS 2.5 mg Tab, Take 2.5 mg by mouth 2 (two) times daily., Disp: , Rfl:     FLUoxetine 10  MG capsule, Take 10 mg by mouth., Disp: , Rfl:     furosemide (LASIX) 20 MG tablet, Take 20 mg by mouth daily as needed., Disp: , Rfl:     HUMALOG MIX 75-25 KWIKPEN 100 unit/mL (75-25) InPn, Inject into the skin. Pt reports taking a sliding scale, Disp: , Rfl:     hydrALAZINE (APRESOLINE) 25 MG tablet, Take 25 mg by mouth 3 (three) times daily., Disp: , Rfl:     insulin lispro (HUMALOG) 100 unit/mL injection, Inject into the skin 3 (three) times daily before meals., Disp: , Rfl:     lactulose (CHRONULAC) 10 gram/15 mL solution, SMARTSI teaspoon By Mouth Twice Daily, Disp: , Rfl:     LANTUS SOLOSTAR U-100 INSULIN 100 unit/mL (3 mL) InPn pen, Inject 10 Units into the skin every evening., Disp: , Rfl:     lisinopril 10 MG tablet, Take 10 mg by mouth once daily., Disp: , Rfl:     losartan (COZAAR) 100 MG tablet, Take 100 mg by mouth., Disp: , Rfl:     metoprolol tartrate (LOPRESSOR) 100 MG tablet, Take 100 mg by mouth 2 (two) times daily., Disp: , Rfl:     pantoprazole (PROTONIX) 40 MG tablet, Take 40 mg by mouth., Disp: , Rfl:     sevelamer carbonate (RENVELA) 800 mg Tab, Take 800 mg by mouth 3 (three) times daily., Disp: , Rfl:     sodium bicarbonate 650 MG tablet, Take 650 mg by mouth 3 (three) times daily., Disp: , Rfl:     sodium zirconium cyclosilicate (LOKELMA) 10 gram packet, Take 10 g by mouth., Disp: , Rfl:     PHYSICAL EXAM:                  Extremities:  Her incision is well healed.  She has no induration or fluctuance.  Her fistula is very easy to palpate.  It is somewhat pulsatile, but improved from previous  LAB RESULTS:  Lab Results   Component Value Date    K 3.5 (L) 2025    K 4.7 2025    K 4.8 2025    CREATININE 5.04 (HH) 2025    CREATININE 4.6 (H) 2025    CREATININE 5.5 (H) 2025     Lab Results   Component Value Date    WBC 0-5 2025    WBC 9.51 2025    WBC 8.10 10/30/2024    HCT 40.4 2025    HCT 41.3 10/30/2024    HCT 26.3 (L) 2023    PLT  234 07/11/2025     10/30/2024     (H) 04/04/2014     Lab Results   Component Value Date    HGBA1C 7.8 (H) 07/23/2025    HGBA1C 7.8 (H) 08/02/2023    HGBA1C 7.4 (H) 03/13/2023       IMAGING (I have independently reviewed and interpreted the following tests):  Dialysis access evaluation shows flows of 1600, with an elevated velocity just distal to the arterial anastomosis that is thought to be due to a valve remnant as well as a elevated velocity of 660 through the mid fistula level along with some vessel narrowing.    IMP/PLAN:     49 y.o. female with a h/o stage V CKD on HD since early 2024, active tobacco use, HLD, HTN, CAD/MI s/p PCIs x2, TIA, and DM II - now status post second-stage superficialization procedure of a left brachiobasilic AV fistula and fistulogram with balloon angioplasty of the stenotic segment in the midportion.  She is doing well and not having any issues with her fistula dialysis.  We will set her up to get her PermCath out soon.  She will need to hold her Eliquis for 2 days prior.    .

## 2025-08-29 ENCOUNTER — OFFICE VISIT (OUTPATIENT)
Dept: VASCULAR SURGERY | Facility: CLINIC | Age: 50
End: 2025-08-29
Payer: MEDICARE

## 2025-08-29 VITALS
WEIGHT: 143.31 LBS | HEART RATE: 85 BPM | HEIGHT: 66 IN | BODY MASS INDEX: 23.03 KG/M2 | TEMPERATURE: 98 F | SYSTOLIC BLOOD PRESSURE: 165 MMHG | DIASTOLIC BLOOD PRESSURE: 86 MMHG

## 2025-08-29 DIAGNOSIS — Z99.2 ESRD (END STAGE RENAL DISEASE) ON DIALYSIS: Primary | ICD-10-CM

## 2025-08-29 DIAGNOSIS — N18.6 ESRD (END STAGE RENAL DISEASE) ON DIALYSIS: Primary | ICD-10-CM

## 2025-08-29 PROCEDURE — 99999 PR PBB SHADOW E&M-EST. PATIENT-LVL IV: CPT | Mod: PBBFAC,,, | Performed by: STUDENT IN AN ORGANIZED HEALTH CARE EDUCATION/TRAINING PROGRAM

## 2025-08-29 PROCEDURE — 99214 OFFICE O/P EST MOD 30 MIN: CPT | Mod: PBBFAC | Performed by: STUDENT IN AN ORGANIZED HEALTH CARE EDUCATION/TRAINING PROGRAM

## (undated) DEVICE — SYR ONLY LUER LOCK 20CC

## (undated) DEVICE — SUT MONOCRYL 3-0 SH U/D

## (undated) DEVICE — PACK AV VASCULAR ACCESS OMC

## (undated) DEVICE — SOL 9P NACL IRR PIC IL

## (undated) DEVICE — SUT 2-0 VICRYL / SH (J417)

## (undated) DEVICE — LOOP VESSEL BLUE MAXI

## (undated) DEVICE — APPLICATOR CHLORAPREP ORN 26ML

## (undated) DEVICE — SUT VICRYL PLUS 3-0 SH 18IN

## (undated) DEVICE — GOWN SURGICAL X-LARGE

## (undated) DEVICE — TOWEL OR DISP STRL BLUE 4/PK

## (undated) DEVICE — COVER INSTR ELASTIC BAND 40X20

## (undated) DEVICE — SUT MCRYL PLUS 4-0 PS2 27IN

## (undated) DEVICE — BAND TR WITH INFLATOR

## (undated) DEVICE — CLIP MED TICALL

## (undated) DEVICE — ELECTRODE MEGADYNE RETURN DUAL

## (undated) DEVICE — DRESSING TRANS 4X4 TEGADERM

## (undated) DEVICE — SUT 3-0 12-18IN SILK

## (undated) DEVICE — SUT VICRYL 3-0 27 SH

## (undated) DEVICE — SHEATH 5FR 6CM

## (undated) DEVICE — DECANTER FLUID TRNSF WHITE 9IN

## (undated) DEVICE — SOL NACL 0.9% IV INJ 1000ML

## (undated) DEVICE — GUIDEWIRE STF .035X180CM ANG

## (undated) DEVICE — SUT PROLENE 5-0 36IN C-1

## (undated) DEVICE — INFLATOR ENCORE

## (undated) DEVICE — GLIDESHEATH SLENDER SS 5FR10CM

## (undated) DEVICE — SUT SILK 2-0 SH 18IN BLACK

## (undated) DEVICE — SUT LIGACLIP SMALL XTRA

## (undated) DEVICE — SYR IRRIGATION BULB STER 60ML

## (undated) DEVICE — COVER LIGHT HANDLE 80/CA

## (undated) DEVICE — SUT PROLENE 6-0 24 BV-1

## (undated) DEVICE — DRAPE PED LAP SURG 108X77IN

## (undated) DEVICE — OMNIPAQUE CONTRAST 350MG/100ML

## (undated) DEVICE — KIT MICROINTRO 4F .018X40X7CM

## (undated) DEVICE — GOWN SMART IMP BREATHABLE XXLG

## (undated) DEVICE — INSERTS STEALTH FIBRA SIZE 1.

## (undated) DEVICE — ELECTRODE REM PLYHSV RETURN 9

## (undated) DEVICE — SHEATH PINNACLE INTRO 10CM 4FR

## (undated) DEVICE — SUT PROLENE 6-0 C-1 30IN BL

## (undated) DEVICE — DRESSING TRANS 2X2 TEGADERM

## (undated) DEVICE — Device

## (undated) DEVICE — CATH ULTRAVERSE 035 7X40X75

## (undated) DEVICE — KIT INTRO MICRO NIT VSI 4FR

## (undated) DEVICE — INFLATOR ENCORE 26 BLLN INFL

## (undated) DEVICE — PAD CURAD NONADH 3X4IN

## (undated) DEVICE — LOOP VESSEL BLUE MINI 2/CARD

## (undated) DEVICE — LOOP VESSEL YELLOW MAXI

## (undated) DEVICE — COVERS PROBE NR-48 STERILE

## (undated) DEVICE — SPONGE GAUZE 16PLY 4X4

## (undated) DEVICE — GAUZE WOVEN STRL 12-PLY 4X4IN

## (undated) DEVICE — TRAY CATH LAB OMC

## (undated) DEVICE — BLLN MUSTANG 6 X 60 X 75